# Patient Record
Sex: MALE | Race: WHITE | Employment: FULL TIME | URBAN - METROPOLITAN AREA
[De-identification: names, ages, dates, MRNs, and addresses within clinical notes are randomized per-mention and may not be internally consistent; named-entity substitution may affect disease eponyms.]

---

## 2017-01-24 ENCOUNTER — HOSPITAL ENCOUNTER (EMERGENCY)
Age: 57
Discharge: HOME | End: 2017-01-24
Attending: EMERGENCY MEDICINE

## 2017-01-24 VITALS
RESPIRATION RATE: 16 BRPM | HEART RATE: 63 BPM | BODY MASS INDEX: 35.6 KG/M2 | HEIGHT: 68 IN | WEIGHT: 234.9 LBS | OXYGEN SATURATION: 97 % | SYSTOLIC BLOOD PRESSURE: 131 MMHG | TEMPERATURE: 98.7 F | DIASTOLIC BLOOD PRESSURE: 82 MMHG

## 2017-01-24 DIAGNOSIS — R42 DIZZINESS: Primary | ICD-10-CM

## 2017-01-24 RX ORDER — ZOLPIDEM TARTRATE 10 MG/1
5 TABLET ORAL
COMMUNITY

## 2017-01-24 NOTE — DISCHARGE INSTRUCTIONS
Dizziness: Care Instructions  Your Care Instructions  Dizziness is the feeling of unsteadiness or fuzziness in your head. It is different than having vertigo, which is a feeling that the room is spinning or that you are moving or falling. It is also different from lightheadedness, which is the feeling that you are about to faint. It can be hard to know what causes dizziness. Some people feel dizzy when they have migraine headaches. Sometimes bouts of flu can make you feel dizzy. Some medical conditions, such as heart problems or high blood pressure, can make you feel dizzy. Many medicines can cause dizziness, including medicines for high blood pressure, pain, or anxiety. If a medicine causes your symptoms, your doctor may recommend that you stop or change the medicine. If it is a problem with your heart, you may need medicine to help your heart work better. If there is no clear reason for your symptoms, your doctor may suggest watching and waiting for a while to see if the dizziness goes away on its own. Follow-up care is a key part of your treatment and safety. Be sure to make and go to all appointments, and call your doctor if you are having problems. It's also a good idea to know your test results and keep a list of the medicines you take. How can you care for yourself at home? · If your doctor recommends or prescribes medicine, take it exactly as directed. Call your doctor if you think you are having a problem with your medicine. · Do not drive while you feel dizzy. · Try to prevent falls. Steps you can take include:  ¨ Using nonskid mats, adding grab bars near the tub, and using night-lights. ¨ Clearing your home so that walkways are free of anything you might trip on. ¨ Letting family and friends know that you have been feeling dizzy. This will help them know how to help you. When should you call for help? Call 911 anytime you think you may need emergency care.  For example, call if:  · You passed out (lost consciousness). · You have dizziness along with symptoms of a heart attack. These may include:  ¨ Chest pain or pressure, or a strange feeling in the chest.  ¨ Sweating. ¨ Shortness of breath. ¨ Nausea or vomiting. ¨ Pain, pressure, or a strange feeling in the back, neck, jaw, or upper belly or in one or both shoulders or arms. ¨ Lightheadedness or sudden weakness. ¨ A fast or irregular heartbeat. · You have symptoms of a stroke. These may include:  ¨ Sudden numbness, tingling, weakness, or loss of movement in your face, arm, or leg, especially on only one side of your body. ¨ Sudden vision changes. ¨ Sudden trouble speaking. ¨ Sudden confusion or trouble understanding simple statements. ¨ Sudden problems with walking or balance. ¨ A sudden, severe headache that is different from past headaches. Call your doctor now or seek immediate medical care if:  · You feel dizzy and have a fever, headache, or ringing in your ears. · You have new or increased nausea and vomiting. · Your dizziness does not go away or comes back. Watch closely for changes in your health, and be sure to contact your doctor if:  · You do not get better as expected. Where can you learn more? Go to http://lorie-yuridia.info/. Enter Q004 in the search box to learn more about \"Dizziness: Care Instructions. \"  Current as of: May 27, 2016  Content Version: 11.1  © 7033-2240 [employer]. Care instructions adapted under license by [employer] (which disclaims liability or warranty for this information). If you have questions about a medical condition or this instruction, always ask your healthcare professional. [place] [address] any warranty or liability for your use of this information.

## 2017-01-24 NOTE — UC PROVIDER NOTE
HPI Comments: Sudden onset dizziness associated with blurry vision, no loss of vision , no double vision,  No difficulty speaking or swallowing, no headache, no focal weakness in arms, legs or face- symptoms resolved after 30seconds    Patient is a 64 y.o. male presenting with dizziness. The history is provided by the patient. Dizziness   This is a new problem. The current episode started 3 to 5 hours ago. The problem has been resolved. There was no focality noted. Primary symptoms include visual change. Pertinent negatives include no focal weakness, no loss of sensation, no loss of balance, no slurred speech, no speech difficulty, no memory loss, no movement disorder, no agitation, no auditory change, no mental status change, no unresponsiveness and no disorientation. There has been no fever. Pertinent negatives include no shortness of breath, no chest pain, no vomiting, no altered mental status, no confusion, no headaches, no choking, no nausea, no bowel incontinence and no bladder incontinence. Associated medical issues do not include trauma, mood changes, bleeding disorder, seizures, dementia, CVA or clotting disorder. Past Medical History   Diagnosis Date    Depression 8/26/2014    Hypothyroidism 8/26/2014    Migraines 8/26/2014        Past Surgical History   Procedure Laterality Date    Hx orthopaedic       meniscus    Hx hernia repair           History reviewed. No pertinent family history. Social History     Social History    Marital status:      Spouse name: N/A    Number of children: N/A    Years of education: N/A     Occupational History    Not on file.      Social History Main Topics    Smoking status: Never Smoker    Smokeless tobacco: Former User    Alcohol use Yes    Drug use: Not on file    Sexual activity: Not on file     Other Topics Concern    Not on file     Social History Narrative                ALLERGIES: Review of patient's allergies indicates no known allergies. Review of Systems   Constitutional: Negative. HENT: Negative. Eyes: Positive for visual disturbance. Respiratory: Negative. Negative for choking and shortness of breath. Cardiovascular: Negative. Negative for chest pain. Gastrointestinal: Negative. Negative for bowel incontinence, nausea and vomiting. Genitourinary: Negative for bladder incontinence. Neurological: Positive for dizziness. Negative for focal weakness, seizures, syncope, facial asymmetry, speech difficulty, weakness, numbness, headaches and loss of balance. Psychiatric/Behavioral: Negative for agitation, confusion and memory loss. All other systems reviewed and are negative. Vitals:    01/24/17 1534   BP: 131/82   Pulse: 63   Resp: 16   Temp: 98.7 °F (37.1 °C)   SpO2: 97%   Weight: 106.5 kg (234 lb 14.4 oz)   Height: 5' 8\" (1.727 m)       Physical Exam   Constitutional: He is oriented to person, place, and time. He appears well-developed and well-nourished. HENT:   Head: Normocephalic and atraumatic. Mouth/Throat: Oropharynx is clear and moist. No oropharyngeal exudate. Eyes: Conjunctivae and EOM are normal. Pupils are equal, round, and reactive to light. Right eye exhibits no discharge. Left eye exhibits no discharge. No scleral icterus. Neck: Normal range of motion. Neck supple. No tracheal deviation present. No thyromegaly present. Cardiovascular: Normal rate, regular rhythm, normal heart sounds and intact distal pulses. No murmur heard. Pulmonary/Chest: Effort normal and breath sounds normal.   Musculoskeletal: Normal range of motion. He exhibits no edema or tenderness. Lymphadenopathy:     He has no cervical adenopathy. Neurological: He is alert and oriented to person, place, and time. No cranial nerve deficit. Coordination normal.   Skin: Skin is warm. No rash noted. No erythema. Psychiatric: He has a normal mood and affect.  His behavior is normal. Judgment and thought content normal. Nursing note and vitals reviewed.       MDM     Differential Diagnosis; Clinical Impression; Plan:     Dizziness resolved, possible vertigo, possible migraine equivalent given patient's history of same, doubt tia/cva- recommend to emergency department if symptoms recur, otherwise follow up pcp      Procedures

## 2017-04-29 ENCOUNTER — APPOINTMENT (OUTPATIENT)
Dept: GENERAL RADIOLOGY | Age: 57
End: 2017-04-29
Attending: FAMILY MEDICINE

## 2017-04-29 ENCOUNTER — HOSPITAL ENCOUNTER (EMERGENCY)
Age: 57
Discharge: HOME | End: 2017-04-29
Attending: FAMILY MEDICINE

## 2017-04-29 VITALS
SYSTOLIC BLOOD PRESSURE: 146 MMHG | RESPIRATION RATE: 18 BRPM | OXYGEN SATURATION: 97 % | DIASTOLIC BLOOD PRESSURE: 82 MMHG | HEART RATE: 60 BPM | TEMPERATURE: 98.6 F | HEIGHT: 67 IN | WEIGHT: 242 LBS | BODY MASS INDEX: 37.98 KG/M2

## 2017-04-29 DIAGNOSIS — M79.671 RIGHT FOOT PAIN: Primary | ICD-10-CM

## 2017-04-29 RX ORDER — DICLOFENAC POTASSIUM 50 MG/1
50 TABLET, FILM COATED ORAL 3 TIMES DAILY
Qty: 50 TAB | Refills: 0 | Status: SHIPPED | OUTPATIENT
Start: 2017-04-29 | End: 2017-07-15

## 2017-04-29 NOTE — DISCHARGE INSTRUCTIONS

## 2017-04-29 NOTE — UC PROVIDER NOTE
HPI Comments: Patient reports pain started soon after cutting grass 1 week ago. Patient is a 64 y.o. male presenting with foot pain. The history is provided by the patient. No  was used. Foot Pain    This is a new problem. The current episode started more than 1 week ago. The problem occurs constantly. The problem has not changed since onset. The pain is present in the right foot (Right foot pain). The quality of the pain is described as aching. The pain is at a severity of 8/10. The pain is moderate. Pertinent negatives include no numbness and full range of motion. The symptoms are aggravated by palpation and movement (ambulation). He has tried nothing for the symptoms. The treatment provided no relief. There has been no history of extremity trauma. Past Medical History:   Diagnosis Date    Depression 8/26/2014    Hypothyroidism 8/26/2014    Migraines 8/26/2014        Past Surgical History:   Procedure Laterality Date    HX HERNIA REPAIR      HX ORTHOPAEDIC      meniscus         History reviewed. No pertinent family history. Social History     Social History    Marital status:      Spouse name: N/A    Number of children: N/A    Years of education: N/A     Occupational History    Not on file. Social History Main Topics    Smoking status: Never Smoker    Smokeless tobacco: Former User    Alcohol use Yes    Drug use: Not on file    Sexual activity: Not on file     Other Topics Concern    Not on file     Social History Narrative                ALLERGIES: Review of patient's allergies indicates no known allergies. Review of Systems   Constitutional: Negative. HENT: Negative. Eyes: Negative. Respiratory: Negative. Cardiovascular: Negative. Gastrointestinal: Negative. Endocrine: Negative. Genitourinary: Negative. Musculoskeletal:        Right foot pain   Skin: Negative. Allergic/Immunologic: Negative. Neurological: Negative. Negative for numbness. Hematological: Negative. Psychiatric/Behavioral: Negative. Vitals:    04/29/17 0837   BP: 146/82   Pulse: 60   Resp: 18   Temp: 98.6 °F (37 °C)   SpO2: 97%   Weight: 109.8 kg (242 lb)   Height: 5' 7\" (1.702 m)       Physical Exam   Constitutional: He is oriented to person, place, and time. He appears well-developed and well-nourished. HENT:   Head: Normocephalic and atraumatic. Right Ear: External ear normal.   Left Ear: External ear normal.   Nose: Nose normal.   Mouth/Throat: Oropharynx is clear and moist.   Eyes: Conjunctivae and EOM are normal. Pupils are equal, round, and reactive to light. Neck: Normal range of motion. Neck supple. Cardiovascular: Normal rate, regular rhythm and normal heart sounds. Pulmonary/Chest: Effort normal and breath sounds normal.   Musculoskeletal: Normal range of motion. He exhibits no edema, tenderness or deformity. Right foot plantar surface between 1st and 2nd MT tenderness  No erythema   2+ DP/PT pulses  Nail intact  No deformity    Lymphadenopathy:     He has no cervical adenopathy. Neurological: He is alert and oriented to person, place, and time. Skin: Skin is warm and dry. Psychiatric: He has a normal mood and affect. His behavior is normal. Judgment and thought content normal.   Nursing note and vitals reviewed. MDM     Differential Diagnosis; Clinical Impression; Plan:     CLINICAL IMPRESSION:  Right foot pain  (primary encounter diagnosis)    Plan:  1. Right foot pain. 2. Take Diclofenac as directed with food. 3. Follow up with Podiatry. Amount and/or Complexity of Data Reviewed:   Tests in the radiology section of CPT®:  Ordered and reviewed  Risk of Significant Complications, Morbidity, and/or Mortality:   Presenting problems: Moderate  Diagnostic procedures:   Moderate  Progress:   Patient progress:  Stable      Procedures

## 2017-07-15 ENCOUNTER — HOSPITAL ENCOUNTER (OUTPATIENT)
Age: 57
Setting detail: OBSERVATION
Discharge: HOME | End: 2017-07-16
Attending: EMERGENCY MEDICINE | Admitting: INTERNAL MEDICINE
Payer: COMMERCIAL

## 2017-07-15 ENCOUNTER — APPOINTMENT (OUTPATIENT)
Dept: GENERAL RADIOLOGY | Age: 57
End: 2017-07-15
Attending: EMERGENCY MEDICINE
Payer: COMMERCIAL

## 2017-07-15 DIAGNOSIS — I24.9 ACS (ACUTE CORONARY SYNDROME) (HCC): ICD-10-CM

## 2017-07-15 DIAGNOSIS — I25.9 CHEST PAIN DUE TO MYOCARDIAL ISCHEMIA, UNSPECIFIED ISCHEMIC CHEST PAIN TYPE: Primary | ICD-10-CM

## 2017-07-15 LAB
ALBUMIN SERPL BCP-MCNC: 3.7 G/DL (ref 3.5–5)
ALBUMIN/GLOB SERPL: 1.1 {RATIO} (ref 1.1–2.2)
ALP SERPL-CCNC: 85 U/L (ref 45–117)
ALT SERPL-CCNC: 43 U/L (ref 12–78)
ANION GAP BLD CALC-SCNC: 8 MMOL/L (ref 5–15)
AST SERPL W P-5'-P-CCNC: 21 U/L (ref 15–37)
BASOPHILS # BLD AUTO: 0 K/UL (ref 0–0.1)
BASOPHILS # BLD: 0 % (ref 0–1)
BILIRUB SERPL-MCNC: 0.4 MG/DL (ref 0.2–1)
BUN SERPL-MCNC: 30 MG/DL (ref 6–20)
BUN/CREAT SERPL: 18 (ref 12–20)
CALCIUM SERPL-MCNC: 9 MG/DL (ref 8.5–10.1)
CHLORIDE SERPL-SCNC: 108 MMOL/L (ref 97–108)
CO2 SERPL-SCNC: 25 MMOL/L (ref 21–32)
CREAT SERPL-MCNC: 1.71 MG/DL (ref 0.7–1.3)
EOSINOPHIL # BLD: 0.2 K/UL (ref 0–0.4)
EOSINOPHIL NFR BLD: 2 % (ref 0–7)
ERYTHROCYTE [DISTWIDTH] IN BLOOD BY AUTOMATED COUNT: 13.4 % (ref 11.5–14.5)
GLOBULIN SER CALC-MCNC: 3.5 G/DL (ref 2–4)
GLUCOSE SERPL-MCNC: 104 MG/DL (ref 65–100)
HCT VFR BLD AUTO: 41.6 % (ref 36.6–50.3)
HGB BLD-MCNC: 15.1 G/DL (ref 12.1–17)
INR PPP: 1.2 (ref 0.9–1.1)
LYMPHOCYTES # BLD AUTO: 34 % (ref 12–49)
LYMPHOCYTES # BLD: 3.3 K/UL (ref 0.8–3.5)
MAGNESIUM SERPL-MCNC: 2.3 MG/DL (ref 1.6–2.4)
MCH RBC QN AUTO: 33.2 PG (ref 26–34)
MCHC RBC AUTO-ENTMCNC: 36.3 G/DL (ref 30–36.5)
MCV RBC AUTO: 91.4 FL (ref 80–99)
MONOCYTES # BLD: 0.6 K/UL (ref 0–1)
MONOCYTES NFR BLD AUTO: 7 % (ref 5–13)
NEUTS SEG # BLD: 5.4 K/UL (ref 1.8–8)
NEUTS SEG NFR BLD AUTO: 57 % (ref 32–75)
PLATELET # BLD AUTO: 184 K/UL (ref 150–400)
POTASSIUM SERPL-SCNC: 3.4 MMOL/L (ref 3.5–5.1)
PROT SERPL-MCNC: 7.2 G/DL (ref 6.4–8.2)
PROTHROMBIN TIME: 11.7 SEC (ref 9–11.1)
RBC # BLD AUTO: 4.55 M/UL (ref 4.1–5.7)
SALICYLATES SERPL-MCNC: <1.7 MG/DL (ref 2.8–20)
SODIUM SERPL-SCNC: 141 MMOL/L (ref 136–145)
TROPONIN I SERPL-MCNC: 0.12 NG/ML
WBC # BLD AUTO: 9.5 K/UL (ref 4.1–11.1)

## 2017-07-15 PROCEDURE — 80053 COMPREHEN METABOLIC PANEL: CPT | Performed by: EMERGENCY MEDICINE

## 2017-07-15 PROCEDURE — 80307 DRUG TEST PRSMV CHEM ANLYZR: CPT | Performed by: EMERGENCY MEDICINE

## 2017-07-15 PROCEDURE — 85025 COMPLETE CBC W/AUTO DIFF WBC: CPT | Performed by: EMERGENCY MEDICINE

## 2017-07-15 PROCEDURE — 85610 PROTHROMBIN TIME: CPT | Performed by: EMERGENCY MEDICINE

## 2017-07-15 PROCEDURE — 99285 EMERGENCY DEPT VISIT HI MDM: CPT

## 2017-07-15 PROCEDURE — 84484 ASSAY OF TROPONIN QUANT: CPT | Performed by: EMERGENCY MEDICINE

## 2017-07-15 PROCEDURE — 36415 COLL VENOUS BLD VENIPUNCTURE: CPT | Performed by: EMERGENCY MEDICINE

## 2017-07-15 PROCEDURE — 93005 ELECTROCARDIOGRAM TRACING: CPT

## 2017-07-15 PROCEDURE — 71010 XR CHEST PORT: CPT

## 2017-07-15 PROCEDURE — 65660000000 HC RM CCU STEPDOWN

## 2017-07-15 PROCEDURE — 83735 ASSAY OF MAGNESIUM: CPT | Performed by: EMERGENCY MEDICINE

## 2017-07-15 RX ORDER — SODIUM CHLORIDE 0.9 % (FLUSH) 0.9 %
5-10 SYRINGE (ML) INJECTION AS NEEDED
Status: DISCONTINUED | OUTPATIENT
Start: 2017-07-15 | End: 2017-07-16 | Stop reason: HOSPADM

## 2017-07-15 RX ORDER — ASPIRIN 325 MG
325 TABLET ORAL ONCE
Status: COMPLETED | OUTPATIENT
Start: 2017-07-15 | End: 2017-07-16

## 2017-07-15 RX ORDER — METOPROLOL TARTRATE 25 MG/1
25 TABLET, FILM COATED ORAL
Status: COMPLETED | OUTPATIENT
Start: 2017-07-15 | End: 2017-07-16

## 2017-07-15 RX ORDER — SODIUM CHLORIDE 0.9 % (FLUSH) 0.9 %
5-10 SYRINGE (ML) INJECTION EVERY 8 HOURS
Status: DISCONTINUED | OUTPATIENT
Start: 2017-07-15 | End: 2017-07-16 | Stop reason: HOSPADM

## 2017-07-16 VITALS
OXYGEN SATURATION: 98 % | SYSTOLIC BLOOD PRESSURE: 119 MMHG | DIASTOLIC BLOOD PRESSURE: 73 MMHG | RESPIRATION RATE: 16 BRPM | WEIGHT: 240 LBS | TEMPERATURE: 97.7 F | BODY MASS INDEX: 37.67 KG/M2 | HEIGHT: 67 IN | HEART RATE: 46 BPM

## 2017-07-16 PROBLEM — R07.9 CHEST PAIN: Status: ACTIVE | Noted: 2017-07-16

## 2017-07-16 LAB
ATRIAL RATE: 76 BPM
CALCULATED P AXIS, ECG09: 59 DEGREES
CALCULATED R AXIS, ECG10: 36 DEGREES
CALCULATED T AXIS, ECG11: 163 DEGREES
DIAGNOSIS, 93000: NORMAL
P-R INTERVAL, ECG05: 154 MS
Q-T INTERVAL, ECG07: 378 MS
QRS DURATION, ECG06: 74 MS
QTC CALCULATION (BEZET), ECG08: 425 MS
TROPONIN I SERPL-MCNC: 0.15 NG/ML
VENTRICULAR RATE, ECG03: 76 BPM

## 2017-07-16 PROCEDURE — 99218 HC RM OBSERVATION: CPT

## 2017-07-16 PROCEDURE — 74011250637 HC RX REV CODE- 250/637: Performed by: EMERGENCY MEDICINE

## 2017-07-16 PROCEDURE — 84484 ASSAY OF TROPONIN QUANT: CPT | Performed by: INTERNAL MEDICINE

## 2017-07-16 PROCEDURE — 77010033678 HC OXYGEN DAILY

## 2017-07-16 PROCEDURE — 36415 COLL VENOUS BLD VENIPUNCTURE: CPT | Performed by: INTERNAL MEDICINE

## 2017-07-16 RX ORDER — SODIUM CHLORIDE 0.9 % (FLUSH) 0.9 %
5-10 SYRINGE (ML) INJECTION EVERY 8 HOURS
Status: CANCELLED | OUTPATIENT
Start: 2017-07-16

## 2017-07-16 RX ORDER — SODIUM CHLORIDE 0.9 % (FLUSH) 0.9 %
5-10 SYRINGE (ML) INJECTION AS NEEDED
Status: CANCELLED | OUTPATIENT
Start: 2017-07-16

## 2017-07-16 RX ADMIN — METOPROLOL TARTRATE 25 MG: 25 TABLET ORAL at 00:15

## 2017-07-16 RX ADMIN — ASPIRIN 325 MG ORAL TABLET 325 MG: 325 PILL ORAL at 00:15

## 2017-07-16 RX ADMIN — Medication 10 ML: at 00:15

## 2017-07-16 NOTE — H&P
[place] [facility], [address]   HISTORY AND PHYSICAL       Name:  Kristyn Patino   MR#:  [id number]   :  1960   Account #:  [de-identified]        Date of Adm:  07/15/2017       CHIEF COMPLAINT: He presented to the emergency room last   evening. HISTORY OF PRESENT ILLNESS: He had been working outside all   day during the day, yesterday it was quite hot. 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 He felt fine while he was doing work, had no chest discomfort or other   symptoms. He and his son went and got a sub for dinner and he was sitting at   home in his reclining couch resting in the evening after dinner. He   began having some unusual achings in the left side beginning in his   leg and left arm and shoulder. He just could not find a position of   comfort in the recliner. Turns on his side, had some aching up the left   side of his neck and encircling the left ear and left side of his head he   had a headache. This all seemed rather odd to him. He is used to   having aches and pains, but this was more specific, localized left sided   discomfort. This included some left sided chest pain. He called for his   wife. His wife gave him an aspirin and they decided to come to the   emergency room. By the time he arrived here, most of his symptoms   had resolved. He was pretty much painfree. He really did not think that   he needed to take anything else for the pain at the time. His evaluation here was unremarkable. His EKG showed sinus rhythm   with some minor non-specific ST changes and similar to tracing from   one year ago. LABORATORY STUDIES: Showed a troponin level that was 0.12. There was no CK drawn. His creatinine is up a little bit at 1.71. BUN of   30.  So I suspect he is a little dehydrated from all the work yesterday   even though he was trying to drink and stay hydrated through the day. At no time during all of his exertion did he have any chest discomfort. He was evaluated here almost exactly a year ago, 07/2016, presenting   with bilateral arm pain, fatigue and heaviness. At that time he had right   arm discomfort worse than the left. Radiation of pain into the right side   of his jaw. He has reported similar symptoms in the past. Last year his   event occurred after spending the day walking outside at [place]   in the hot weather. Last year his creatinine was 1.4, just a little elevated   as well. He also had some minor increase in his troponin level 0.08 on   that occasion. He subsequently had a stress echo test in our office   which was normal.    Also with last night's event he felt like his heart was racing. His wife   checked his pulse and blood pressure which pulse was 103 and blood   pressure 136/86 so not too significant at that point. He was free of any discomfort overnight here. He did not sleep well, he   uses a CPAP which he didn't have with him here. PAST HISTORY: Notable for hyperlipidemia, sleep apnea, hypothyroid   on replacement, diabetes mellitus on oral agents. MEDICATIONS: Include:    1. Amino acids. 2. Fish oil. 3. Some form of root extract. 4. Zocor/simvastatin 10 mg nightly. 5. Zoloft 50 mg daily. 6. Synthroid 150 micrograms daily. 7. Metformin 500 mg b.i.d.    8. Topamax 5 mg b.i.d. ALLERGIES: UNKNOWN. SOCIAL: Nonsmoker, he is employed as an , works for   [employer], does software design. FAMILY HISTORY: Negative for premature coronary disease. REVIEW OF SYSTEMS: Comprehensive review of systems as above,   otherwise unremarkable. PHYSICAL EXAMINATION   GENERAL: He appears stated age, no acute distress. VITAL SIGNS: Afebrile, pulse rate is running about 60, blood pressure   120/80, respirations 18, saturation 98% on room air. HEENT: Atraumatic. SKIN: Warm and dry. LUNGS: Clear to auscultation. CHEST: No tenderness on palpation. HEART: Regular rhythm and rate, no murmurs, rubs or gallops. ABDOMEN: Soft, nontender. EXTREMITIES: No clubbing, cyanosis or edema. NEURO: Exam is grossly normal, no focal neurologic deficits. ASSESSMENT:    1. Chest pain, likely musculoskeletal.   2. Dehydration with elevated creatinine. 3. Sleep apnea. 4. Hyperlipidemia. 5. Diabetes mellitus type 2 on oral agents. PLAN: We are going to repeat a troponin and get a CK level. If these   are within reasonable range, then I think we could safely discharge him   and follow up as an outpatient. I suspect that this was all due to the   physical exertion of moving furniture, etc. yesterday during the heat of   the day, a long day, with some dehydration. Could do a stress test, but must say that 8 hours of moving furniture in   the heat yesterday is certainly a stress test in and of itself.          Lorenza Lantigua MD      [address] [facility] /    D:  07/16/2017   08:29   T:  07/16/2017   09:29   Job #:  [id number]

## 2017-07-16 NOTE — ED NOTES
TRANSFER - OUT REPORT:    Verbal report given to Tj Carr (name) on Anju Sánchez being transferred to [facility] (unit) for routine progression of care       Report consisted of patients Situation, Background, Assessment and   Recommendations(SBAR). Information from the following report(s) SBAR, Kardex, ED Summary, Intake/Output, MAR, Accordion, Recent Results and Cardiac Rhythm NSR was reviewed with the receiving nurse. Lines:   Peripheral IV 07/15/17 Left Antecubital (Active)   Site Assessment Clean, dry, & intact 7/15/2017 10:28 PM   Phlebitis Assessment 0 7/15/2017 10:28 PM   Infiltration Assessment 0 7/15/2017 10:28 PM   Dressing Status Clean, dry, & intact 7/15/2017 10:28 PM   Dressing Type Tape;Transparent 7/15/2017 10:28 PM   Hub Color/Line Status Pink;Flushed 7/15/2017 10:28 PM   Action Taken Blood drawn 7/15/2017 10:28 PM        Opportunity for questions and clarification was provided.       Patient transported with:   Monitor  Registered Nurse

## 2017-07-16 NOTE — PROGRESS NOTES
Feeling better. Repeat Troponin unchanged. No indication that this is cardiac . I think this is MS, along with some dehydration from the exertion and heat yesterday. OK to discharge. Eat, drink , sleep and should feel fine. No other studies needed    See PRN.

## 2017-07-16 NOTE — DISCHARGE SUMMARY
[address] [place] [address] [place]   DISCHARGE SUMMARY       Name:  Jacques Ferraro   MR#:  [id number]   :  1960   Account #:  [de-identified]        Date of Adm:  07/15/2017       DISCHARGE DIAGNOSES     1. Noncardiac chest pain. 2. Dehydration. 3. Sleep apnea. 4. Hyperlipidemia. 5. Diabetes mellitus type 2, on oral agents. DISCHARGE REGIMEN: He will stay on his same medications   including    1. Amino acid daily. 2. Root extract daily. 3. Fish oil. 4. Synthroid 150 mcg daily. 5. Metformin 500 mg b.i.d.   6. Zoloft 50 mg daily. 7. Simvastatin 10 mg daily. 8. Topamax 100 mg b.i.d. These are unchanged from his preadmission medications. I have   added no new medications. FOLLOWUP: He will follow up with his primary care doctor, Girish Drake. We will see him back on a p.r.n. basis. HISTORY: He is a 59-year-old male who came to the emergency room   last night with symptoms of left-sided leg aching, arm aching, aching   up into his shoulder, the left side of his chest and up the left head and   ear. He had been working hard yesterday helping someone move for   about 8 or 10 hours in the heat, carrying heavy furniture, etc. He tried   to stay hydrated. His EKG showed no changes. His troponin level was 0.12 and 0.15, so   slight increase on 2 determinations. His creatinine on admission was   1.7, consistent with some dehydration, BUN was 30. Baseline was 1.4   and 20 previously. He did not sleep well overnight. He uses a CPAP which he did not   have here and just with all the commotion he did not sleep well but he   feels fine today. He has been up and around. His symptoms are better. We will get him something to eat and drink and I think that he can be   discharged home and follow up as needed. There is nothing here that would indicate this being a cardiac event.      I really do not think a stress test is going to be helpful. He has had a   couple of negative stress tests in the past. Most recently he had a   stress test 1 year ago for an episode that was very similar to this   occasion. Certainly, moving furniture for 8 or 10 hours yesterday in the heat is a   significant stress test. He had no symptoms whatsoever during the   activity and I do not think there is anything further that needs to be   done. We will see him on a p.r.n. basis.               MD [place] / Jamie Webster   D:  07/16/2017   13:33   T:  07/16/2017   13:54   Job #:  [id number]

## 2017-07-16 NOTE — ED PROVIDER NOTES
HPI Comments: Rojas Morgan, 64 y.o. Male with PMHx of Hypothyroidism , presents ambulatory to ED [facility] ED with cc of constant, but improved pain in his left leg and left arm, which began 1.5 hours ago while watching TV in his recliner. He also developed a left-sided HA. He states he had a hard time seeing out of his left eye but was unsure if it was just runny due to allergies. He readjusted his position in the chair to try to alleviate the pain his extremities. He states he then thought he was having acid reflux coming on as he was having a burning sensation in his chest and then his heart started racing. He took his pulses and BP at home, which were 103 and 136/86, respectively. He took a baby ASA and decided to come to the ED. He notes that he helped his friends move today in the heat for 9 hours. He reports he is feeling better now. He states he has had chest pain in the past for which he saw a cardiologist, did a stress test, and was cleared. PCP: Hermila Aguero MD    Social history significant for: Former Tobacco, - EtOH, - Illicit Drug Use    There are no other complaints, changes, or physical findings at this time. The history is provided by the patient. Past Medical History:   Diagnosis Date    Depression 8/26/2014    Hypothyroidism 8/26/2014    Migraines 8/26/2014       Past Surgical History:   Procedure Laterality Date    HX APPENDECTOMY      HX HERNIA REPAIR      HX ORTHOPAEDIC      meniscus         History reviewed. No pertinent family history. Social History     Social History    Marital status:      Spouse name: N/A    Number of children: N/A    Years of education: N/A     Occupational History    Not on file.      Social History Main Topics    Smoking status: Never Smoker    Smokeless tobacco: Former User    Alcohol use No    Drug use: No    Sexual activity: Not on file     Other Topics Concern    Not on file     Social History Narrative         ALLERGIES: Review of patient's allergies indicates no known allergies. Review of Systems   Constitutional: Negative. Negative for activity change, appetite change, chills, fatigue, fever and unexpected weight change. HENT: Negative. Negative for congestion, hearing loss, rhinorrhea, sneezing and voice change. Eyes: Positive for visual disturbance. Negative for pain. Respiratory: Negative. Negative for apnea, cough, choking, chest tightness and shortness of breath. Cardiovascular: Positive for chest pain (burning) and palpitations. Gastrointestinal: Negative. Negative for abdominal distention, abdominal pain, blood in stool, diarrhea, nausea and vomiting. Genitourinary: Negative. Negative for difficulty urinating, flank pain, frequency and urgency. No discharge   Musculoskeletal: Positive for myalgias. Negative for arthralgias, back pain and neck stiffness. Skin: Negative. Negative for color change and rash. Neurological: Negative. Negative for dizziness, seizures, syncope, speech difficulty, weakness, numbness and headaches. Hematological: Negative for adenopathy. Psychiatric/Behavioral: Negative. Negative for agitation, behavioral problems, dysphoric mood and suicidal ideas. The patient is not nervous/anxious. Vitals:    07/16/17 0200 07/16/17 0252 07/16/17 0635 07/16/17 1233   BP: 119/77 112/68 102/63 119/73   Pulse: (!) 50 (!) 54 64 (!) 46   Resp: 16 16 16 16   Temp:  97.9 °F (36.6 °C) 98.2 °F (36.8 °C) 97.7 °F (36.5 °C)   SpO2: 97% 98% 99% 98%   Weight:       Height:                Physical Exam   Constitutional: He is oriented to person, place, and time. He appears well-developed and well-nourished. No distress. HENT:   Head: Normocephalic and atraumatic. Mouth/Throat: Oropharynx is clear and moist. No oropharyngeal exudate. Eyes: Conjunctivae and EOM are normal. Pupils are equal, round, and reactive to light. Right eye exhibits no discharge.  Left eye exhibits no discharge. Neck: Normal range of motion. Neck supple. Cardiovascular: Normal rate, regular rhythm and intact distal pulses. Exam reveals no gallop and no friction rub. No murmur heard. Pulmonary/Chest: Effort normal and breath sounds normal. No respiratory distress. He has no wheezes. He has no rales. He exhibits no tenderness. Abdominal: Soft. Bowel sounds are normal. He exhibits no distension and no mass. There is no tenderness. There is no rebound and no guarding. Musculoskeletal: Normal range of motion. He exhibits no edema. Lymphadenopathy:     He has no cervical adenopathy. Neurological: He is alert and oriented to person, place, and time. No cranial nerve deficit. Coordination normal.   Skin: Skin is warm and dry. No rash noted. No erythema. Psychiatric: He has a normal mood and affect. Nursing note and vitals reviewed. MDM  Number of Diagnoses or Management Options  Diagnosis management comments: DDx: arrythmia, ACS, dehydration, musculoskeletal        Amount and/or Complexity of Data Reviewed  Clinical lab tests: reviewed and ordered  Tests in the radiology section of CPT®: ordered and reviewed  Tests in the medicine section of CPT®: ordered and reviewed  Review and summarize past medical records: yes  Discuss the patient with other providers: yes (Cardiology)  Independent visualization of images, tracings, or specimens: yes      ED Course       Procedures      Chief Complaint   Patient presents with    Chest Pain     L arm pain and L leg pain x 1 hour PTA, reports that \"pulse went up to 103 and my BP was 136/86\"       11:01 PM  The patients presenting problems have been discussed, and they are in agreement with the care plan formulated and outlined with them. I have encouraged them to ask questions as they arise throughout their visit.     MEDICATIONS GIVEN:  Medications   aspirin (ASPIRIN) tablet 325 mg (325 mg Oral Given 7/16/17 0015)   metoprolol tartrate (LOPRESSOR) tablet 25 mg (25 mg Oral Given 7/16/17 0015)       LABS REVIEWED:  No results found for this or any previous visit (from the past 24 hour(s)). VITAL SIGNS:  No data found. RADIOLOGY RESULTS:  The following have been ordered and reviewed:  XR CHEST PORT   Final Result   CXR Results  (Last 48 hours)    None             EKG interpretation: (Preliminary) 21:14  Rhythm: normal sinus rhythm; and regular . Rate (approx.): 76; Axis: normal; P wave: normal; QRS interval: normal ; ST/T wave: normal; No change since 7/2016. CONSULTATIONS:     11:43 PM  Daron Kyle MD spoke with Dr. Jeanine Siddiqui, Consult for Cardiology. Discussed HPI and PE, available diagnostic tests and clinical findings. He is in agreement with care plans as outlined. He/she wants patient admitted. PROGRESS NOTES:  10:57 PM  I have just reevaluated the patient. I have reviewed His vital signs and determined there is currently no worsening in their condition or physical exam.  Results have been reviewed with them and their questions have been answered. We will continue to review further results as they come available. DIAGNOSIS:    1. Chest pain due to myocardial ischemia, unspecified ischemic chest pain type ([address]    2. ACS (acute coronary syndrome) [facility])        PLAN:  Follow-up Information     Follow up With Details Comments [address], [address] Lam Knight MD  If symptoms worsen [address]  [id number]  [address] [address]  [phone]          Discharge Medication List as of 7/16/2017  1:46 PM      CONTINUE these medications which have NOT CHANGED    Details   AMINO ACIDS (AMINO ACID PO) Take  by mouth.  Indications: glycine supplement, Historical Med      omega-3 fatty acids-vitamin e (FISH OIL) 1,000 mg cap Take 1 Cap by mouth., Historical Med      ashwagandha root extract,bulk, 2.5 % powd by Does Not Apply route., Historical Med      zolpidem (AMBIEN) 10 mg tablet Take 5 mg by mouth nightly as needed for Sleep., Historical Med      simvastatin (ZOCOR) 10 mg tablet Take  by mouth nightly., Historical Med      sertraline (ZOLOFT) 50 mg tablet Take  by mouth daily. , Historical Med      levothyroxine (LEVOTHROID) 150 mcg tablet Take  by mouth Daily (before breakfast). , Historical Med      metformin (GLUCOPHAGE) 500 mg tablet Take  by mouth two (2) times daily (with meals). , Historical Med      topiramate (TOPAMAX) 100 mg tablet Take  by mouth two (2) times a day., Historical Med               ED COURSE: The patients hospital course has been uncomplicated. 12:33 AM  Patient is being admitted to the hospital.  The results of their tests and reasons for their admission have been discussed with them and/or available family. They convey agreement and understanding for the need to be admitted and for their admission diagnosis. Consultation has been made with the inpatient physician specialist for hospitalization. This note is prepared by Randa Fung, acting as a Scribe for Stefany Tran. Charlie Kyle, [facility] Charlie Kyle MD: The scribe's documentation has been prepared under my direction and personally reviewed by me in its entirety. I confirm that the notes above accurately reflects all work, treatment, procedures, and medical decision making performed by me.

## 2017-07-16 NOTE — ED TRIAGE NOTES
Patient arrives ambulatory to ED with complaint of pain and discomfort in left arm and left leg; pulse started racing; \"felt weird. \" Patient states that these symptoms started about an hour ago and has seen gotten somewhat better. Patient states his left eye was runny and blurry and has a headache on left side of head. Patient states he felt an acid reflux/buringing sensation in his chest. Patient denies shortness of breath, N/V/D. Patient states he felt sick when it happened. Per patient his he felt his heart was racing and heart rate went to 103, BP was up.

## 2017-11-26 ENCOUNTER — HOSPITAL ENCOUNTER (EMERGENCY)
Age: 57
Discharge: HOME | End: 2017-11-26
Attending: FAMILY MEDICINE

## 2017-11-26 VITALS
TEMPERATURE: 98 F | HEIGHT: 67 IN | RESPIRATION RATE: 20 BRPM | OXYGEN SATURATION: 97 % | WEIGHT: 239 LBS | SYSTOLIC BLOOD PRESSURE: 142 MMHG | DIASTOLIC BLOOD PRESSURE: 79 MMHG | BODY MASS INDEX: 37.51 KG/M2 | HEART RATE: 78 BPM

## 2017-11-26 DIAGNOSIS — J98.01 COUGH DUE TO BRONCHOSPASM: Primary | ICD-10-CM

## 2017-11-26 DIAGNOSIS — J30.9 ACUTE ALLERGIC RHINITIS, UNSPECIFIED SEASONALITY, UNSPECIFIED TRIGGER: ICD-10-CM

## 2017-11-26 RX ORDER — ESOMEPRAZOLE MAGNESIUM 40 MG/1
CAPSULE, DELAYED RELEASE ORAL DAILY
COMMUNITY

## 2017-11-26 RX ORDER — PREDNISONE 5 MG/1
TABLET ORAL
Qty: 21 TAB | Refills: 0 | Status: SHIPPED | OUTPATIENT
Start: 2017-11-26 | End: 2018-02-07

## 2017-11-26 RX ORDER — PHENTERMINE HYDROCHLORIDE 15 MG/1
15 CAPSULE ORAL
COMMUNITY
End: 2020-12-14

## 2017-11-26 RX ORDER — FLUTICASONE PROPIONATE 50 MCG
2 SPRAY, SUSPENSION (ML) NASAL DAILY
Qty: 1 BOTTLE | Refills: 0 | Status: SHIPPED | OUTPATIENT
Start: 2017-11-26 | End: 2017-12-26

## 2017-11-26 NOTE — UC PROVIDER NOTE
HPI Comments:   2 weeks of intermittent cough. It is not constant or daily. It is mostly dry. However sometimes has some phlegm in back of throat. After coughing hard does have wheezing. Has been using sons albuterol that resolves cough. Cough worse in evenings compared to mornings. Had flare up after raking leaves in yard a couple days ago. No fever or chills, no SOB or chest pains. No history of asthma. Did see allergist recently with multiple allergens was put on antihistamine and taking daily. History significant for GERD, depression, hypothyroidism    Patient is a 62 y.o. male presenting with cold symptoms. Cold Symptoms    Pertinent negatives include no chest pain, no nausea and no vomiting. Past Medical History:   Diagnosis Date    Depression 8/26/2014    Hypothyroidism 8/26/2014    Migraines 8/26/2014        Past Surgical History:   Procedure Laterality Date    HX APPENDECTOMY      HX HERNIA REPAIR      HX ORTHOPAEDIC      meniscus         History reviewed. No pertinent family history. Social History     Social History    Marital status:      Spouse name: N/A    Number of children: N/A    Years of education: N/A     Occupational History    Not on file. Social History Main Topics    Smoking status: Never Smoker    Smokeless tobacco: Former User    Alcohol use No    Drug use: No    Sexual activity: Not on file     Other Topics Concern    Not on file     Social History Narrative                ALLERGIES: Review of patient's allergies indicates no known allergies. Review of Systems   Constitutional: Negative for chills and fever. Cardiovascular: Negative for chest pain. Gastrointestinal: Negative for nausea and vomiting. All other systems reviewed and are negative.       Vitals:    11/26/17 1237   BP: 142/79   Pulse: 78   Resp: 20   Temp: 98 °F (36.7 °C)   SpO2: 97%   Weight: 108.4 kg (239 lb)   Height: 5' 7\" (1.702 m)       Physical Exam   Constitutional: He is oriented to person, place, and time. No distress. Non-toxic appearing, well hydrated   HENT:   Pale boggy nasal turbinates R and L. No purulent discharge. OP with post nasal drip on wall. No swelling, erythema or exudates. TMs normal bilat. Eyes: Conjunctivae and EOM are normal. Pupils are equal, round, and reactive to light. No scleral icterus. Neck: Normal range of motion. Neck supple. Cardiovascular: Normal rate, regular rhythm and normal heart sounds. Exam reveals no gallop and no friction rub. No murmur heard. Pulmonary/Chest: Effort normal. No respiratory distress. He has wheezes. He has no rales. Few soft scattered wheeze upper lung fields   Lymphadenopathy:     He has no cervical adenopathy. Neurological: He is alert and oriented to person, place, and time. No cranial nerve deficit. Skin: Skin is warm and dry. No rash noted. He is not diaphoretic. No erythema. No pallor. Psychiatric: He has a normal mood and affect. His behavior is normal. Thought content normal.   Nursing note and vitals reviewed. MDM     Differential Diagnosis; Clinical Impression; Plan:       CLINICAL IMPRESSION:  (J98.01) Cough due to bronchospasm  (primary encounter diagnosis)  (J30.9) Acute allergic rhinitis, unspecified seasonality, unspecified trigger    Orders Placed This Encounter     RX:      fluticasone (FLONASE) 50 mcg/actuation nasal spray      predniSONE (STERAPRED) 5 mg dose pack    Follow up with allergist.  Schedule with PCP for re-eval in 1 week. DDX: post nasal drip cough syndrome, GERD    Plan:  1. See above orders. 2. Immediate follow up for new or worsening. Risk of Significant Complications, Morbidity, and/or Mortality:   Presenting problems: Moderate  Diagnostic procedures: Moderate  Management options:   Moderate  Progress:   Patient progress:  Stable      Procedures

## 2017-11-26 NOTE — DISCHARGE INSTRUCTIONS
Wheezing or Bronchoconstriction: Care Instructions  Your Care Instructions  Wheezing is a whistling noise made during breathing. It occurs when the small airways, or bronchial tubes, that lead to your lungs swell or contract (spasm) and become narrow. This narrowing is called bronchoconstriction. When your airways constrict, it is hard for air to pass through and this makes it hard for you to breathe. Wheezing and bronchoconstriction can be caused by many problems, including:  · An infection such as the flu or a cold. · Allergies such as hay fever. · Diseases such as asthma or chronic obstructive pulmonary disease. · Smoking. Treatment for your wheezing depends on what is causing the problem. Your wheezing may get better without treatment. But you may need to pay attention to things that cause your wheezing and avoid them. Or you may need medicine to help treat the wheezing and to reduce the swelling or to relieve spasms in your lungs. Follow-up care is a key part of your treatment and safety. Be sure to make and go to all appointments, and call your doctor if you are having problems. It is also a good idea to know your test results and keep a list of the medicines you take. How can you care for yourself at home? · Take your medicine exactly as prescribed. Call your doctor if you think you are having a problem with your medicine. You will get more details on the specific medicine your doctor prescribes. · If your doctor prescribed antibiotics, take them as directed. Do not stop taking them just because you feel better. You need to take the full course of antibiotics. · Breathe moist air from a humidifier, hot shower, or sink filled with hot water. This may help ease your symptoms and make it easier for you to breathe. · If you have congestion in your nose and throat, drinking plenty of fluids, especially hot fluids, may help relieve your symptoms.  If you have kidney, heart, or liver disease and have to limit fluids, talk with your doctor before you increase the amount of fluids you drink. · If you have mucus in your airways, it may help to breathe deeply and cough. · Do not smoke or allow others to smoke around you. Smoking can make your wheezing worse. If you need help quitting, talk to your doctor about stop-smoking programs and medicines. These can increase your chances of quitting for good. · Avoid things that may cause your wheezing. These may include colds, smoke, air pollution, dust, pollen, pets, cockroaches, stress, and cold air. When should you call for help? Call 911 anytime you think you may need emergency care. For example, call if:  ? · You have severe trouble breathing. ? · You passed out (lost consciousness). ?Call your doctor now or seek immediate medical care if:  ? · You cough up yellow, dark brown, or bloody mucus (sputum). ? · You have new or worse shortness of breath. ? · Your wheezing is not getting better or it gets worse after you start taking your medicine. ? Watch closely for changes in your health, and be sure to contact your doctor if:  ? · You do not get better as expected. Where can you learn more? Go to http://lorie-yuridia.info/. Enter 454 [id number] in the search box to learn more about \"Wheezing or Bronchoconstriction: Care Instructions. \"  Current as of: May 12, 2017  Content Version: 11.4  © 9744-7707 [employer]. Care instructions adapted under license by [employer] (which disclaims liability or warranty for this information). If you have questions about a medical condition or this instruction, always ask your healthcare professional. [address] any warranty or liability for your use of this information.

## 2018-02-07 ENCOUNTER — HOSPITAL ENCOUNTER (EMERGENCY)
Age: 58
Discharge: HOME | End: 2018-02-07
Attending: FAMILY MEDICINE

## 2018-02-07 VITALS
TEMPERATURE: 98.2 F | OXYGEN SATURATION: 98 % | RESPIRATION RATE: 20 BRPM | HEIGHT: 67 IN | BODY MASS INDEX: 40.02 KG/M2 | SYSTOLIC BLOOD PRESSURE: 132 MMHG | HEART RATE: 77 BPM | WEIGHT: 255 LBS | DIASTOLIC BLOOD PRESSURE: 76 MMHG

## 2018-02-07 DIAGNOSIS — J01.90 ACUTE SINUSITIS, RECURRENCE NOT SPECIFIED, UNSPECIFIED LOCATION: Primary | ICD-10-CM

## 2018-02-07 RX ORDER — LOSARTAN POTASSIUM 50 MG/1
TABLET ORAL DAILY
COMMUNITY

## 2018-02-07 RX ORDER — AZELASTINE 1 MG/ML
1 SPRAY, METERED NASAL 2 TIMES DAILY
Qty: 1 BOTTLE | Refills: 0 | Status: SHIPPED | OUTPATIENT
Start: 2018-02-07 | End: 2018-04-23

## 2018-02-07 RX ORDER — PREDNISONE 10 MG/1
TABLET ORAL
Qty: 21 TAB | Refills: 0 | Status: SHIPPED | OUTPATIENT
Start: 2018-02-07 | End: 2018-04-23

## 2018-02-07 RX ORDER — AMOXICILLIN AND CLAVULANATE POTASSIUM 875; 125 MG/1; MG/1
1 TABLET, FILM COATED ORAL 2 TIMES DAILY
Qty: 20 TAB | Refills: 0 | Status: SHIPPED | OUTPATIENT
Start: 2018-02-07 | End: 2018-04-23

## 2018-02-07 NOTE — DISCHARGE INSTRUCTIONS
Saline Nasal Washes: Care Instructions  Your Care Instructions  Saline nasal washes help keep the nasal passages open by washing out thick or dried mucus. This simple remedy can help relieve symptoms of allergies, sinusitis, and colds. It also can make the nose feel more comfortable by keeping the mucous membranes moist. You may notice a little burning sensation in your nose the first few times you use the solution, but this usually gets better in a few days. Follow-up care is a key part of your treatment and safety. Be sure to make and go to all appointments, and call your doctor if you are having problems. It's also a good idea to know your test results and keep a list of the medicines you take. How can you care for yourself at home? · You can buy premixed saline solution in a squeeze bottle or other sinus rinse products at a drugstore. Read and follow the instructions on the label. · You also can make your own saline solution by adding 1 teaspoon of salt and 1 teaspoon of baking soda to 2 cups of distilled water. · If you use a homemade solution, pour a small amount into a clean bowl. Using a rubber bulb syringe, squeeze the syringe and place the tip in the salt water. Pull a small amount of the salt water into the syringe by relaxing your hand. · Sit down with your head tilted slightly back. Do not lie down. Put the tip of the bulb syringe or the squeeze bottle a little way into one of your nostrils. Gently drip or squirt a few drops into the nostril. Repeat with the other nostril. Some sneezing and gagging are normal at first.  · Gently blow your nose. · Wipe the syringe or bottle tip clean after each use. · Repeat this 2 or 3 times a day. · Use nasal washes gently if you have nosebleeds often. When should you call for help? Watch closely for changes in your health, and be sure to contact your doctor if:  ? · You often get nosebleeds. ? · You have problems doing the nasal washes.    Where can you learn more? Go to http://lorie-yuridia.info/. Enter [id number] [id number] [id number] in the search box to learn more about \"Saline Nasal Washes: Care Instructions. \"  Current as of: May 12, 2017  Content Version: 11.4  © 3126-4729 [employer]. Care instructions adapted under license by [employer] (which disclaims liability or warranty for this information). If you have questions about a medical condition or this instruction, always ask your healthcare professional. [address] any warranty or liability for your use of this information. Sinusitis: Care Instructions  Your Care Instructions    Sinusitis is an infection of the lining of the sinus cavities in your head. Sinusitis often follows a cold. It causes pain and pressure in your head and face. In most cases, sinusitis gets better on its own in 1 to 2 weeks. But some mild symptoms may last for several weeks. Sometimes antibiotics are needed. Follow-up care is a key part of your treatment and safety. Be sure to make and go to all appointments, and call your doctor if you are having problems. It's also a good idea to know your test results and keep a list of the medicines you take. How can you care for yourself at home? · Take an over-the-counter pain medicine, such as acetaminophen (Tylenol), ibuprofen (Advil, Motrin), or naproxen (Aleve). Read and follow all instructions on the label. · If the doctor prescribed antibiotics, take them as directed. Do not stop taking them just because you feel better. You need to take the full course of antibiotics. · Be careful when taking over-the-counter cold or flu medicines and Tylenol at the same time. Many of these medicines have acetaminophen, which is Tylenol. Read the labels to make sure that you are not taking more than the recommended dose. Too much acetaminophen (Tylenol) can be harmful.   · Breathe warm, moist air from a steamy shower, a hot bath, or a sink filled with hot water. Avoid cold, dry air. Using a humidifier in your home may help. Follow the directions for cleaning the machine. · Use saline (saltwater) nasal washes to help keep your nasal passages open and wash out mucus and bacteria. You can buy saline nose drops at a grocery store or drugstore. Or you can make your own at home by adding 1 teaspoon of salt and 1 teaspoon of baking soda to 2 cups of distilled water. If you make your own, fill a bulb syringe with the solution, insert the tip into your nostril, and squeeze gently. Jennifer  your nose. · Put a hot, wet towel or a warm gel pack on your face 3 or 4 times a day for 5 to 10 minutes each time. · Try a decongestant nasal spray like oxymetazoline (Afrin). Do not use it for more than 3 days in a row. Using it for more than 3 days can make your congestion worse. When should you call for help? Call your doctor now or seek immediate medical care if:  ? · You have new or worse swelling or redness in your face or around your eyes. ? · You have a new or higher fever. ? Watch closely for changes in your health, and be sure to contact your doctor if:  ? · You have new or worse facial pain. ? · The mucus from your nose becomes thicker (like pus) or has new blood in it. ? · You are not getting better as expected. Where can you learn more? Go to http://lorie-yuridia.info/. Enter W204 in the search box to learn more about \"Sinusitis: Care Instructions. \"  Current as of: May 12, 2017  Content Version: 11.4  © 5642-9371 [employer]. Care instructions adapted under license by [employer] (which disclaims liability or warranty for this information). If you have questions about a medical condition or this instruction, always ask your healthcare professional. [address] any warranty or liability for your use of this information.

## 2018-02-07 NOTE — UC PROVIDER NOTE
Patient is a 62 y.o. male presenting with cold symptoms. The history is provided by the patient. Cold Symptoms    This is a recurrent problem. The current episode started more than 1 week ago. The problem has not changed since onset. There has been no fever. Associated symptoms include congestion, rhinorrhea, sinus pain, sneezing and cough. Pertinent negatives include no chest pain, no nausea and no vomiting. He has tried nothing for the symptoms. Past Medical History:   Diagnosis Date    Depression 8/26/2014    Hypothyroidism 8/26/2014    Migraines 8/26/2014        Past Surgical History:   Procedure Laterality Date    HX APPENDECTOMY      HX HERNIA REPAIR      HX ORTHOPAEDIC      meniscus         History reviewed. No pertinent family history. Social History     Social History    Marital status:      Spouse name: N/A    Number of children: N/A    Years of education: N/A     Occupational History    Not on file. Social History Main Topics    Smoking status: Never Smoker    Smokeless tobacco: Former User    Alcohol use No    Drug use: No    Sexual activity: Not on file     Other Topics Concern    Not on file     Social History Narrative                ALLERGIES: Review of patient's allergies indicates no known allergies. Review of Systems   HENT: Positive for congestion, rhinorrhea, sinus pain and sneezing. Respiratory: Positive for cough. Cardiovascular: Negative for chest pain. Gastrointestinal: Negative for nausea and vomiting. All other systems reviewed and are negative. Vitals:    02/07/18 0844   BP: 132/76   Pulse: 77   Resp: 20   Temp: 98.2 °F (36.8 °C)   SpO2: 98%   Weight: 115.7 kg (255 lb)   Height: 5' 7\" (1.702 m)       Physical Exam   Constitutional: No distress.    HENT:   Right Ear: Tympanic membrane and ear canal normal.   Left Ear: Tympanic membrane and ear canal normal.   Nose: Nose normal.   Mouth/Throat: No oropharyngeal exudate, posterior oropharyngeal edema or posterior oropharyngeal erythema. Eyes: Conjunctivae are normal. Right eye exhibits no discharge. Left eye exhibits no discharge. Neck: Neck supple. Pulmonary/Chest: Effort normal and breath sounds normal. No respiratory distress. He has no wheezes. He has no rales. Lymphadenopathy:     He has no cervical adenopathy. Skin: No rash noted. Nursing note and vitals reviewed. MDM     Differential Diagnosis; Clinical Impression; Plan:     CLINICAL IMPRESSION:  Acute sinusitis, recurrence not specified, unspecified location  (primary encounter diagnosis)      DDX    Plan:    augmentin- astelin- prednisone  Stop flonase    Amount and/or Complexity of Data Reviewed:    Review and summarize past medical records:  Yes  Risk of Significant Complications, Morbidity, and/or Mortality:   Presenting problems: Moderate  Management options:   Moderate  Progress:   Patient progress:  Stable      Procedures

## 2018-04-23 ENCOUNTER — APPOINTMENT (OUTPATIENT)
Dept: CT IMAGING | Age: 58
End: 2018-04-23
Payer: COMMERCIAL

## 2018-04-23 ENCOUNTER — HOSPITAL ENCOUNTER (EMERGENCY)
Age: 58
Discharge: HOME | End: 2018-04-23
Attending: EMERGENCY MEDICINE | Admitting: EMERGENCY MEDICINE
Payer: COMMERCIAL

## 2018-04-23 VITALS
RESPIRATION RATE: 16 BRPM | OXYGEN SATURATION: 97 % | HEART RATE: 78 BPM | HEIGHT: 67 IN | BODY MASS INDEX: 36.99 KG/M2 | SYSTOLIC BLOOD PRESSURE: 135 MMHG | TEMPERATURE: 99.4 F | DIASTOLIC BLOOD PRESSURE: 77 MMHG | WEIGHT: 235.67 LBS

## 2018-04-23 DIAGNOSIS — R39.15 URINARY URGENCY: ICD-10-CM

## 2018-04-23 DIAGNOSIS — N28.0 RENAL INFARCT (HCC): Primary | ICD-10-CM

## 2018-04-23 DIAGNOSIS — N32.89 BLADDER MASS: ICD-10-CM

## 2018-04-23 DIAGNOSIS — L03.317 CELLULITIS OF BUTTOCK: ICD-10-CM

## 2018-04-23 LAB
ALBUMIN SERPL-MCNC: 3.8 G/DL (ref 3.5–5)
ALBUMIN/GLOB SERPL: 1 {RATIO} (ref 1.1–2.2)
ALP SERPL-CCNC: 66 U/L (ref 45–117)
ALT SERPL-CCNC: 37 U/L (ref 12–78)
ANION GAP SERPL CALC-SCNC: 6 MMOL/L (ref 5–15)
APPEARANCE UR: CLEAR
AST SERPL-CCNC: 19 U/L (ref 15–37)
BACTERIA URNS QL MICRO: NEGATIVE /HPF
BASOPHILS # BLD: 0.1 K/UL (ref 0–0.1)
BASOPHILS NFR BLD: 0 % (ref 0–1)
BILIRUB SERPL-MCNC: 0.5 MG/DL (ref 0.2–1)
BILIRUB UR QL: NEGATIVE
BUN SERPL-MCNC: 19 MG/DL (ref 6–20)
BUN/CREAT SERPL: 14 (ref 12–20)
CALCIUM SERPL-MCNC: 9 MG/DL (ref 8.5–10.1)
CHLORIDE SERPL-SCNC: 109 MMOL/L (ref 97–108)
CO2 SERPL-SCNC: 26 MMOL/L (ref 21–32)
COLOR UR: NORMAL
CREAT SERPL-MCNC: 1.4 MG/DL (ref 0.7–1.3)
DIFFERENTIAL METHOD BLD: ABNORMAL
EOSINOPHIL # BLD: 0.4 K/UL (ref 0–0.4)
EOSINOPHIL NFR BLD: 3 % (ref 0–7)
EPITH CASTS URNS QL MICRO: NORMAL /LPF
ERYTHROCYTE [DISTWIDTH] IN BLOOD BY AUTOMATED COUNT: 13.1 % (ref 11.5–14.5)
GLOBULIN SER CALC-MCNC: 3.8 G/DL (ref 2–4)
GLUCOSE SERPL-MCNC: 100 MG/DL (ref 65–100)
GLUCOSE UR STRIP.AUTO-MCNC: NEGATIVE MG/DL
HCT VFR BLD AUTO: 40 % (ref 36.6–50.3)
HGB BLD-MCNC: 14.2 G/DL (ref 12.1–17)
HGB UR QL STRIP: NEGATIVE
HYALINE CASTS URNS QL MICRO: NORMAL /LPF (ref 0–5)
IMM GRANULOCYTES # BLD: 0 K/UL (ref 0–0.04)
IMM GRANULOCYTES NFR BLD AUTO: 0 % (ref 0–0.5)
KETONES UR QL STRIP.AUTO: NEGATIVE MG/DL
LEUKOCYTE ESTERASE UR QL STRIP.AUTO: NEGATIVE
LYMPHOCYTES # BLD: 2.3 K/UL (ref 0.8–3.5)
LYMPHOCYTES NFR BLD: 17 % (ref 12–49)
MCH RBC QN AUTO: 32.6 PG (ref 26–34)
MCHC RBC AUTO-ENTMCNC: 35.5 G/DL (ref 30–36.5)
MCV RBC AUTO: 91.7 FL (ref 80–99)
MONOCYTES # BLD: 1 K/UL (ref 0–1)
MONOCYTES NFR BLD: 7 % (ref 5–13)
NEUTS SEG # BLD: 9.6 K/UL (ref 1.8–8)
NEUTS SEG NFR BLD: 73 % (ref 32–75)
NITRITE UR QL STRIP.AUTO: NEGATIVE
NRBC # BLD: 0 K/UL (ref 0–0.01)
NRBC BLD-RTO: 0 PER 100 WBC
PH UR STRIP: 6 [PH] (ref 5–8)
PLATELET # BLD AUTO: 230 K/UL (ref 150–400)
PMV BLD AUTO: 8.5 FL (ref 8.9–12.9)
POTASSIUM SERPL-SCNC: 4.1 MMOL/L (ref 3.5–5.1)
PROT SERPL-MCNC: 7.6 G/DL (ref 6.4–8.2)
PROT UR STRIP-MCNC: NEGATIVE MG/DL
RBC # BLD AUTO: 4.36 M/UL (ref 4.1–5.7)
RBC #/AREA URNS HPF: NORMAL /HPF (ref 0–5)
SODIUM SERPL-SCNC: 141 MMOL/L (ref 136–145)
SP GR UR REFRACTOMETRY: 1.02 (ref 1–1.03)
UA: UC IF INDICATED,UAUC: NORMAL
UROBILINOGEN UR QL STRIP.AUTO: 1 EU/DL (ref 0.2–1)
WBC # BLD AUTO: 13.3 K/UL (ref 4.1–11.1)
WBC URNS QL MICRO: NORMAL /HPF (ref 0–4)

## 2018-04-23 PROCEDURE — 74177 CT ABD & PELVIS W/CONTRAST: CPT

## 2018-04-23 PROCEDURE — 36415 COLL VENOUS BLD VENIPUNCTURE: CPT | Performed by: PHYSICIAN ASSISTANT

## 2018-04-23 PROCEDURE — 96365 THER/PROPH/DIAG IV INF INIT: CPT

## 2018-04-23 PROCEDURE — 74011250636 HC RX REV CODE- 250/636: Performed by: EMERGENCY MEDICINE

## 2018-04-23 PROCEDURE — 99283 EMERGENCY DEPT VISIT LOW MDM: CPT

## 2018-04-23 PROCEDURE — 81001 URINALYSIS AUTO W/SCOPE: CPT | Performed by: EMERGENCY MEDICINE

## 2018-04-23 PROCEDURE — 80053 COMPREHEN METABOLIC PANEL: CPT | Performed by: PHYSICIAN ASSISTANT

## 2018-04-23 PROCEDURE — 85025 COMPLETE CBC W/AUTO DIFF WBC: CPT | Performed by: PHYSICIAN ASSISTANT

## 2018-04-23 PROCEDURE — 74011636320 HC RX REV CODE- 636/320: Performed by: EMERGENCY MEDICINE

## 2018-04-23 RX ORDER — SODIUM CHLORIDE 0.9 % (FLUSH) 0.9 %
5-10 SYRINGE (ML) INJECTION EVERY 8 HOURS
Status: DISCONTINUED | OUTPATIENT
Start: 2018-04-23 | End: 2018-04-23 | Stop reason: HOSPADM

## 2018-04-23 RX ORDER — SODIUM CHLORIDE 0.9 % (FLUSH) 0.9 %
10 SYRINGE (ML) INJECTION
Status: COMPLETED | OUTPATIENT
Start: 2018-04-23 | End: 2018-04-23

## 2018-04-23 RX ORDER — SODIUM CHLORIDE 9 MG/ML
50 INJECTION, SOLUTION INTRAVENOUS
Status: COMPLETED | OUTPATIENT
Start: 2018-04-23 | End: 2018-04-23

## 2018-04-23 RX ORDER — CLINDAMYCIN HYDROCHLORIDE 300 MG/1
300 CAPSULE ORAL 4 TIMES DAILY
Qty: 40 CAP | Refills: 0 | Status: SHIPPED | OUTPATIENT
Start: 2018-04-23 | End: 2018-05-09 | Stop reason: ALTCHOICE

## 2018-04-23 RX ORDER — SODIUM CHLORIDE 0.9 % (FLUSH) 0.9 %
5-10 SYRINGE (ML) INJECTION AS NEEDED
Status: DISCONTINUED | OUTPATIENT
Start: 2018-04-23 | End: 2018-04-23 | Stop reason: HOSPADM

## 2018-04-23 RX ORDER — CLINDAMYCIN PHOSPHATE 600 MG/50ML
600 INJECTION INTRAVENOUS
Status: COMPLETED | OUTPATIENT
Start: 2018-04-23 | End: 2018-04-23

## 2018-04-23 RX ADMIN — Medication 10 ML: at 15:47

## 2018-04-23 RX ADMIN — SODIUM CHLORIDE 50 ML/HR: 900 INJECTION, SOLUTION INTRAVENOUS at 15:47

## 2018-04-23 RX ADMIN — IOPAMIDOL 100 ML: 755 INJECTION, SOLUTION INTRAVENOUS at 15:47

## 2018-04-23 RX ADMIN — CLINDAMYCIN PHOSPHATE 600 MG: 12 INJECTION, SOLUTION INTRAMUSCULAR; INTRAVENOUS at 17:13

## 2018-04-23 RX ADMIN — Medication 10 ML: at 17:16

## 2018-04-23 NOTE — ED NOTES
Received patient to exam room, sitting in a chair in a position of comfort, call bell within reach; pt reports \"urination problems\" since last week, states he was seen by his PCP with neg urine and was started on flomax, Saturday her reports lower back pain radiating to his right groin area, he also reports tenderness at his buttocks crease and thinks he may have another returning pilodinal cyst

## 2018-04-23 NOTE — ED TRIAGE NOTES
Assumed care of this patient. He is alert and oriented x4. Patient reports that he has been experiencing urgency with pain and difficulty urinating. Patient reporting lower abd pressure and pain in genitals x2. Patient states the pain is now radiating into the back, +nausea. He states that he has a scar on his buttock from previous cyst that is now tender.

## 2018-04-23 NOTE — ED NOTES
Patient discharged by Dr. Dick Terry. Patient provided with discharge instructions Rx and instructions on follow up care. Patient out of ED ambulatory accompanied by spouse.

## 2018-04-23 NOTE — ED PROVIDER NOTES
I have seen and evaluated this patient in the Express Care portion of triage for difficulty urinating x ~ 1 week, lower back pressure radiating into groin, and possible returning pilonidal cyst.  The patients care will begin now and orders have been placed. This patient will be seen and provided further care in the Emergency Room. Written by julisa Stoddard scribe for [facility]. 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 The pt reports associated sx of suprapubic and penile pressure / aching radiating to the diffuse lower back (right greater than left) and intermittent nausea as well. He expresses that he was seen by his PCP on 4/18 and after a negative prostate examination and UA he was discharged on Flomax for his sx. The pt discloses that he began taking the Flomax after the appointment with some relief in his urgency. He states that his back pain has worsened and is exacerbated with any movement noting there are no relieving factors leading him to the ED. He is also c/o a boil to his right superior buttock that is painful and swollen x2-3 days. The pt denies any h/o kidney stones but discloses a family h/o kidney stones.  He denies any fevers, chills, chest pain, SOB, vomiting, diarrhea, penile swelling, or testicular swelling. PCP: Gladys Reynoso MD    There are no other complaints, changes, or physical findings at this time. Current Facility-Administered Medications   Medication Dose Route Frequency Provider Last Rate Last Dose    sodium chloride (NS) flush 5-10 mL  5-10 mL IntraVENous [address] [place]        sodium chloride (NS) flush 5-10 mL  5-10 mL IntraVENous PRN [address] [place]         Current Outpatient Prescriptions   Medication Sig Dispense Refill    losartan (COZAAR) 50 mg tablet Take  by mouth daily.  mometasone (ASMANEX TWISTHALER) 220 mcg (120 doses) aepb inhaler Take  by inhalation.  fluticasone (FLONASE SENSIMIST) 27.5 mcg/actuation nasal spray 2 Sprays by Nasal route daily.  amoxicillin-clavulanate (AUGMENTIN) 875-125 mg per tablet Take 1 Tab by mouth two (2) times a day. 20 Tab 0    predniSONE (STERAPRED DS) 10 mg dose pack As directed 21 Tab 0    azelastine (ASTELIN) 137 mcg (0.1 %) nasal spray 1 [place] by Both Nostrils route two (2) times a day. Use in each nostril as directed 1 Bottle 0    phentermine (ADIPEX_P) 15 mg capsule Take 15 mg by mouth every morning.  esomeprazole (NEXIUM) 40 mg capsule Take  by mouth daily.  omega-3 fatty acids-vitamin e (FISH OIL) 1,000 mg cap Take 1 Cap by mouth.  zolpidem (AMBIEN) 10 mg tablet Take 5 mg by mouth nightly as needed for Sleep.  simvastatin (ZOCOR) 10 mg tablet Take  by mouth nightly.  sertraline (ZOLOFT) 50 mg tablet Take  by mouth daily.  levothyroxine (LEVOTHROID) 150 mcg tablet Take  by mouth Daily (before breakfast).  metformin (GLUCOPHAGE) 500 mg tablet Take  by mouth two (2) times daily (with meals).  topiramate (TOPAMAX) 100 mg tablet Take  by mouth two (2) times a day.          Past History     Past Medical History:  Past Medical History:   Diagnosis Date    Depression 8/26/2014    Hypothyroidism 8/26/2014    Migraines 8/26/2014       Past Surgical History:  Past Surgical History:   Procedure Laterality Date    HX APPENDECTOMY      HX HERNIA REPAIR      HX ORTHOPAEDIC      meniscus       Family History:  No family history on file. Social History:  Social History   Substance Use Topics    Smoking status: Never Smoker    Smokeless tobacco: Former User    Alcohol use No       Allergies:  No Known Allergies      Review of Systems   Review of Systems   Constitutional: Negative for chills and fever. Respiratory: Negative for cough and shortness of breath. Cardiovascular: Negative for chest pain. Gastrointestinal: Positive for abdominal pain (suprapubic radiating to diffuse lower back ) and nausea (intermittent ). Negative for constipation, diarrhea and vomiting. Genitourinary: Positive for decreased urine volume, difficulty urinating, penile pain and urgency. Negative for penile swelling and scrotal swelling. Musculoskeletal: Positive for back pain (diffuse lower (right greater than left) ). Skin: Positive for wound (boil right superior buttock ). Neurological: Negative for weakness and numbness. All other systems reviewed and are negative. Physical Exam   Physical Exam   Constitutional: He is oriented to person, place, and time. He appears well-developed and well-nourished. HENT:   Head: Normocephalic and atraumatic. Eyes: Conjunctivae and EOM are normal.   Neck: Normal range of motion. Neck supple. Cardiovascular: Normal rate and regular rhythm. Pulmonary/Chest: Effort normal and breath sounds normal. No respiratory distress. Abdominal: Soft. He exhibits no distension. There is no tenderness. There is no CVA tenderness. Genitourinary:   Genitourinary Comments: No groin tenderness   Musculoskeletal: Normal range of motion. Neurological: He is alert and oriented to person, place, and time. Skin: Skin is warm and dry. At right superior buttock there is a small area of induration, erythema, warmth, and tenderness, no fluctuance. Psychiatric: He has a normal mood and affect. Nursing note and vitals reviewed. Diagnostic Study Results     Labs -     Recent Results (from the past 12 hour(s))   URINALYSIS W/ REFLEX CULTURE    Collection Time: 04/23/18  1:44 PM   Result Value Ref Range    Color YELLOW/STRAW      Appearance CLEAR CLEAR      Specific gravity 1.024 1.003 - 1.030      pH (UA) 6.0 5.0 - 8.0      Protein NEGATIVE  NEG mg/dL    Glucose NEGATIVE  NEG mg/dL    Ketone NEGATIVE  NEG mg/dL    Bilirubin NEGATIVE  NEG      Blood NEGATIVE  NEG      Urobilinogen 1.0 0.2 - 1.0 EU/dL    Nitrites NEGATIVE  NEG      Leukocyte Esterase NEGATIVE  NEG      WBC 0-4 0 - 4 /hpf    RBC 0-5 0 - 5 /hpf    Epithelial cells FEW FEW /lpf    Bacteria NEGATIVE  NEG /hpf    UA:UC IF INDICATED CULTURE NOT INDICATED BY UA RESULT CNI      Hyaline cast 0-2 0 - 5 /lpf   CBC WITH AUTOMATED DIFF    Collection Time: 04/23/18  2:05 PM   Result Value Ref Range    WBC 13.3 (H) 4.1 - 11.1 K/uL    RBC 4.36 4.10 - 5.70 M/uL    HGB 14.2 12.1 - 17.0 g/dL    HCT 40.0 36.6 - 50.3 %    MCV 91.7 80.0 - 99.0 FL    MCH 32.6 26.0 - 34.0 PG    MCHC 35.5 30.0 - 36.5 g/dL    RDW 13.1 11.5 - 14.5 %    PLATELET 618 702 - 930 K/uL    MPV 8.5 (L) 8.9 - 12.9 FL    NRBC 0.0 0  WBC    ABSOLUTE NRBC 0.00 0.00 - 0.01 K/uL    NEUTROPHILS 73 32 - 75 %    LYMPHOCYTES 17 12 - 49 %    MONOCYTES 7 5 - 13 %    EOSINOPHILS 3 0 - 7 %    BASOPHILS 0 0 - 1 %    IMMATURE GRANULOCYTES 0 0.0 - 0.5 %    ABS. NEUTROPHILS 9.6 (H) 1.8 - 8.0 K/UL    ABS. LYMPHOCYTES 2.3 0.8 - 3.5 K/UL    ABS. MONOCYTES 1.0 0.0 - 1.0 K/UL    ABS. EOSINOPHILS 0.4 0.0 - 0.4 K/UL    ABS. BASOPHILS 0.1 0.0 - 0.1 K/UL    ABS. IMM.  GRANS. 0.0 0.00 - 0.04 K/UL    DF AUTOMATED     METABOLIC PANEL, COMPREHENSIVE    Collection Time: 04/23/18  2:05 PM   Result Value Ref Range    Sodium 141 136 - 145 mmol/L    Potassium 4.1 3.5 - 5.1 mmol/L    Chloride 109 (H) 97 - 108 mmol/L    CO2 26 21 - 32 mmol/L    Anion gap 6 5 - 15 mmol/L Glucose 100 65 - 100 mg/dL    BUN 19 6 - 20 MG/DL    Creatinine 1.40 (H) 0.70 - 1.30 MG/DL    BUN/Creatinine ratio 14 12 - 20      GFR est AA >60 >60 ml/min/1.73m2    GFR est non-AA 52 (L) >60 ml/min/1.73m2    Calcium 9.0 8.5 - 10.1 MG/DL    Bilirubin, total 0.5 0.2 - 1.0 MG/DL    ALT (SGPT) 37 12 - 78 U/L    AST (SGOT) 19 15 - 37 U/L    Alk. phosphatase 66 45 - 117 U/L    Protein, total 7.6 6.4 - 8.2 g/dL    Albumin 3.8 3.5 - 5.0 g/dL    Globulin 3.8 2.0 - 4.0 g/dL    A-G Ratio 1.0 (L) 1.1 - 2.2         Radiologic Studies -   CT Results  (Last 48 hours)               04/23/18 1547  CT ABD PELV W CONT Final result    Impression:  IMPRESSION:       1. Wedge-shaped area of hypodensity in the lower pole of the right kidney with   trace perirenal stranding most likely representing an infarct. 2. Hepatic steatosis. 3. Calcification in the posterior lateral right bladder wall. Narrative:  EXAM:  CT ABD PELV W CONT       INDICATION: Abdominal pain. Urinary issues. COMPARISON: None       CONTRAST:  100 mL of Isovue-370. TECHNIQUE:    Following the uneventful intravenous administration of contrast, thin axial   images were obtained through the abdomen and pelvis. Coronal and sagittal   reconstructions were generated. Oral contrast was not administered. CT dose   reduction was achieved through use of a standardized protocol tailored for this   examination and automatic exposure control for dose modulation. FINDINGS:    LUNG BASES: Clear. INCIDENTALLY IMAGED HEART AND MEDIASTINUM: Unremarkable. LIVER: The liver is hypodense related to hepatic steatosis. No mass or biliary   dilatation. GALLBLADDER: Unremarkable. SPLEEN: No mass. PANCREAS: No mass or ductal dilatation. ADRENALS: Unremarkable. KIDNEYS: There is a wedge-shaped area of hypodensity in the lower pole the right   kidney measuring 1.4 x 2.7 x 2.0 cm. There is slight adjacent perirenal   stranding.  The attenuation of this area measures 31 Hounsfield units. A 5 mm   hypodensity is present in the superior pole left kidney that is too small fully   catheterize, but likely represents a small cyst. No mass, calculus, or   hydronephrosis. STOMACH: Unremarkable. SMALL BOWEL: No dilatation or wall thickening. COLON: No dilatation or wall thickening. APPENDIX: Unremarkable. PERITONEUM: No ascites or pneumoperitoneum. RETROPERITONEUM: No lymphadenopathy or aortic aneurysm. REPRODUCTIVE ORGANS: Prosthetic conclusions are present. URINARY BLADDER: There is an 8 x 4 mm calcification in the posterior lateral   right-sided the bladder that appears associated with the wall. BONES: No destructive bone lesion. ADDITIONAL COMMENTS: N/A                     Medical Decision Making   I am the first provider for this patient. I reviewed the vital signs, available nursing notes, past medical history, past surgical history, family history and social history. Vital Signs-Reviewed the patient's vital signs. Patient Vitals for the past 12 hrs:   Temp Pulse Resp BP SpO2   04/23/18 1319 99.4 °F (37.4 °C) 78 16 135/77 97 %       Pulse Oximetry Analysis - 97% on room air    Cardiac Monitor:   Rate: 78 bpm  Rhythm: Normal Sinus Rhythm        Records Reviewed: Nursing Notes, Old Medical Records, Previous Radiology Studies and Previous Laboratory Studies    Provider Notes (Medical Decision Making):     DDx: kidney stone, BPH, Cancer, ARF, UTI. On separate complaint the pt also appears to have cellulitis of the buttock. There is no obvious abscess that needs I&D at this time. ED Course:   Initial assessment performed. The patients presenting problems have been discussed, and they are in agreement with the care plan formulated and outlined with them. I have encouraged them to ask questions as they arise throughout their visit.     Progress Notes:    CONSULT NOTE:  5:28 PM  Garry Denny M.D spoke with Harlee Hashimoto,  Specialty: Urology  Discussed pt's hx, disposition, and available diagnostic and imaging results. Reviewed care plans. Consultant recommends having the pt call the kidney stone hotline to set up an appointment for tomorrow. He may need a cystoscopy to rule out cancer. Consultant states if the pt remains afebrile he is safe for discharge. Written by Jerry Fitzgerald, ED Scribe, as dictated by Gregoria Le M.D    Critical Care Time: 0 minutes    Disposition:  Discharge Note:  5:55 PM  The patient is ready for discharge. The patient's signs, symptoms, diagnosis, and discharge instruction have been discussed and the patient has conveyed their understanding. The patient is to follow up as recommended or return to the ER should their symptoms worsen. Plan has been discussed and the patient is in agreement. Written by Jerry Fitzgerald ED Scribe, as dictated by Gregoria Le M.D    PLAN:  1. Current Discharge Medication List        2. Follow-up Information     Follow up With Details [address] [place] [address]  [phone]          Return to ED if worse     Diagnosis     Clinical Impression:   1. Renal infarct ([address]    2. Urinary urgency    3. Cellulitis of buttock    4. Bladder mass        Attestations:    Attestation: This note is prepared by Ron Fitzgerald, acting as Scribe for Gregoria Le M.D. Gregoria Le M.D: The scribe's documentation has been prepared under my direction and personally reviewed by me in its entirety. I confirm that the note above accurately reflects all work, treatment, procedures, and medical decision making performed by me.

## 2018-04-25 ENCOUNTER — OFFICE VISIT (OUTPATIENT)
Dept: SURGERY | Age: 58
End: 2018-04-25

## 2018-04-25 VITALS
TEMPERATURE: 97.5 F | HEART RATE: 67 BPM | BODY MASS INDEX: 37.28 KG/M2 | DIASTOLIC BLOOD PRESSURE: 69 MMHG | HEIGHT: 67 IN | WEIGHT: 237.5 LBS | OXYGEN SATURATION: 95 % | SYSTOLIC BLOOD PRESSURE: 115 MMHG

## 2018-04-25 DIAGNOSIS — L02.91 ABSCESS: Primary | ICD-10-CM

## 2018-04-25 RX ORDER — TAMSULOSIN HYDROCHLORIDE 0.4 MG/1
0.4 CAPSULE ORAL DAILY
COMMUNITY

## 2018-04-25 RX ORDER — LIDOCAINE HYDROCHLORIDE AND EPINEPHRINE 20; 10 MG/ML; UG/ML
10 INJECTION, SOLUTION INFILTRATION; PERINEURAL ONCE
Qty: 10 ML | Refills: 0
Start: 2018-04-25 | End: 2018-04-25

## 2018-04-25 NOTE — PROGRESS NOTES
[facility] - [address] [facility]  OFFICE PROCEDURE PROGRESS NOTE        Chart reviewed for the following:   Rick CORONADO LPN, have reviewed the History, Physical and updated the Allergic reactions for [address] performed immediately prior to start of procedure:   Radha Gardner LPN, have performed the following reviews on Diana Richardson prior to the start of the procedure:            * Patient was identified by name and date of birth   * Agreement on procedure being performed was verified  * Risks and Benefits explained to the patient  * Procedure site verified and marked as necessary  * Patient was positioned for comfort  * Consent was signed and verified     Time: 1300      Date of procedure: 4/25/2018    Procedure performed by:  Ela Mullen MD    Provider assisted by: CHARLA Bloom LPN    Patient assisted by: self    How tolerated by patient: Pt tolerated procedure well.     Post Procedural Pain Scale: 0/10    Comments: none

## 2018-04-25 NOTE — PROGRESS NOTES
1. Have you been to the ER, urgent care clinic since your last visit? Hospitalized since your last visit? New patient  2. Have you seen or consulted any other health care providers outside of the [address] [place] [address] since your last visit? Include any pap smears or colon screening. New patient. Discussed advanced directive. Patient states that he does not have an advanced directive.

## 2018-04-25 NOTE — PROGRESS NOTES
HISTORY OF PRESENT ILLNESS  Jordi Swann is a 62 y.o. male. HPI Comments:   Was in the ER a couple days ago with tailbone pain  Had a CT scan: right kidney, infract? Started on Abx for buttock abscess    Had cysto: bladder stones, prostate enlarged  Saw Dr. Johanna Suarez today    Taking clinda and cipro        ____________________________________________________________________________  Patient presents with:  Skin Problem: seen at the request of Dr. Johanna Suarez eval abscess on buttuck     /69 (BP 1 Location: Right arm, BP Patient Position: Sitting)  Pulse 67  Temp 97.5 °F (36.4 °C)  Ht 5' 7\" (1.702 m)  Wt 107.7 kg (237 lb 8 oz)  SpO2 95%  BMI 37.2 kg/m2  Past Medical History:  8/26/2014: Depression  8/26/2014: Hypothyroidism  8/26/2014: Migraines  Past Surgical History:  No date: HX APPENDECTOMY  No date: HX HERNIA REPAIR  No date: HX ORTHOPAEDIC      Comment: meniscus  Social History    Marital status:              Spouse name:                       Years of education:                 Number of children:               Social History Main Topics    Smoking status: Never Smoker                                                                Smokeless status: Former User                       Alcohol use: No              Drug use: No              Review of patient's family history indicates:    Cancer                         Mother                      Comment: lung    Cancer                         Father                      Comment: colon    Stroke                         Father                    Heart Disease                  Father                    Current Outpatient Prescriptions:  tamsulosin (FLOMAX) 0.4 mg capsule, Take 0.4 mg by mouth daily. GLYCINE PO, Take  by mouth. ASHWAGANDHA ROOT EXTRACT,BULK,, by Does Not Apply route. clindamycin (CLEOCIN) 300 mg capsule, Take 1 Cap by mouth four (4) times daily. losartan (COZAAR) 50 mg tablet, Take  by mouth daily.   mometasone [facility]) 220 mcg (120 doses) aepb inhaler, Take  by inhalation. phentermine (ADIPEX_P) 15 mg capsule, Take 15 mg by mouth every morning. esomeprazole (NEXIUM) 40 mg capsule, Take  by mouth daily. omega-3 fatty acids-vitamin e (FISH OIL) 1,000 mg cap, Take 1 Cap by mouth.  zolpidem (AMBIEN) 10 mg tablet, Take 5 mg by mouth nightly as needed for Sleep.  simvastatin (ZOCOR) 10 mg tablet, Take  by mouth nightly. sertraline (ZOLOFT) 50 mg tablet, Take  by mouth daily. levothyroxine (LEVOTHROID) 150 mcg tablet, Take  by mouth Daily (before breakfast). metformin (GLUCOPHAGE) 500 mg tablet, Take  by mouth two (2) times daily (with meals). topiramate (TOPAMAX) 100 mg tablet, Take  by mouth two (2) times a day. No current facility-administered medications for this visit. Allergies: No Known Allergies  _____________________________________________________________________________      Skin Problem   The history is provided by the patient. This is a new problem. The current episode started more than 2 days ago. The problem occurs constantly. The problem has not changed since onset. Pertinent negatives include no chest pain, no abdominal pain, no headaches and no shortness of breath. The symptoms are aggravated by twisting and bending. The treatment provided no relief. Review of Systems   Constitutional: Negative for chills, fever and weight loss. HENT: Negative for ear pain. Eyes: Negative for pain. Respiratory: Negative for shortness of breath. Cardiovascular: Negative for chest pain. Gastrointestinal: Negative for abdominal pain and blood in stool. Genitourinary: Negative for hematuria. Musculoskeletal: Negative for joint pain. Skin: Negative for rash. Neurological: Negative for dizziness, focal weakness, seizures and headaches. Endo/Heme/Allergies: Does not bruise/bleed easily. Psychiatric/Behavioral: The patient does not have insomnia.         Physical Exam   Constitutional: He is oriented to person, place, and time. He appears well-developed and well-nourished. No distress. HENT:   Head: Normocephalic. Mouth/Throat: No oropharyngeal exudate. Eyes: Pupils are equal, round, and reactive to light. No scleral icterus. Neck: Neck supple. No tracheal deviation present. Cardiovascular: Normal rate, regular rhythm and normal heart sounds. Pulmonary/Chest: Effort normal and breath sounds normal. He has no wheezes. Abdominal: Soft. He exhibits no distension and no mass. There is no hepatosplenomegaly. There is no tenderness. There is no CVA tenderness. No hernia. Musculoskeletal: Normal range of motion. He exhibits no edema. Lymphadenopathy:     He has no cervical adenopathy. Neurological: He is alert and oriented to person, place, and time. Skin: Skin is warm. No rash noted. No erythema. Psychiatric: He has a normal mood and affect. His behavior is normal.       ASSESSMENT and PLAN    ICD-10-CM ICD-9-CM    1. Abscess L02.91 682.9 AEROBIC BACTERIAL CULTURE      I had an extensive discussion with Jennifer Myles regarding the risks, benefits, and alternatives of proceeding with a incision and drainage of right buttock abscess. Risks of surgery including the risk of anesthesia, bleeding, infection, injury to underlying structures, recurrence, need for repeat or more extensive procedures, and the lack of symptomatic improvement were discussed and he is in agreement to proceed. He will complete his course of antibiotics. Will f/u on cx results. Wound care instructions were reviewed and provided in writing. Thank you for this consult. Procedure: Incision and drainage of complex abscess of the right buttocks      Procedure Details: The risks, benefits, and alternatives were explained and consent was obtained for the procedure. The area was sterile prepped and draped in the usual manner. 1% lidocaine with epinephrine was infiltrated into the skin overlying the abscess.   An incision was made. A Very large amount of pus was obtained. A culture was obtained. The loculations and crypts within the wound were broken up with a hemostat. The wound was packed with iodoform gauze. A sterile dressing was then applied. The patient tolerated the procedure well and was feeling better by the time he left the office. Wound care instructions were given.

## 2018-04-25 NOTE — MR AVS SNAPSHOT
[address], [address] [facility], [address] [place] [address] [facility] 
[phone] Patient: Jordi Swann MRN: [id number] PYF:[birth year] Visit Information Date & Time Provider Department Dept. Phone Encounter #  
 4/25/2018 12:10 PM Daniel Carrillo MD [facility] [id number] Your Appointments 5/9/2018  9:00 AM  
POST OP 10 MIN with Daniel Carrillo MD  
[facility] Dr. Sebas Manning ([facility]) Appt Note: I&D  
 [address], [address] [facility], [address] [phone]  
[address] [facility], [address] [facility] [address] [phone] Upcoming Health Maintenance Date Due Hepatitis C Screening 1960 DTaP/Tdap/Td series (1 - Tdap) 11/25/1981 FOBT Q 1 YEAR AGE 50-75 11/25/2010 Influenza Age 5 to Adult 8/1/2017 Allergies as of 4/25/2018  Review Complete On: 4/25/2018 By: Lenise Runner, LPN No Known Allergies Current Immunizations  Never Reviewed No immunizations on file. Not reviewed this visit You Were Diagnosed With   
  
 Codes Comments Abscess    -  Primary ICD-10-CM: L02.91 
ICD-9-CM: 473. 9 Vitals BP Pulse Temp Height(growth percentile) Weight(growth percentile) SpO2  
 115/69 (BP 1 Location: Right arm, BP Patient Position: Sitting) 67 97.5 °F (36.4 °C) 5' 7\" (1.702 m) 237 lb 8 oz (107.7 kg) 95% BMI Smoking Status 37.2 kg/m2 Never Smoker Vitals History BMI and BSA Data Body Mass Index Body Surface Area  
 37.2 kg/m 2 2.26 m 2 Preferred Pharmacy Pharmacy Name Phone [address] [place] [address] [phone] Your Updated Medication List  
  
   
This list is accurate as of 4/25/18  1:21 PM.  Always use your most recent med list.  
  
  
  
  
 AMBIEN 10 mg tablet Generic drug:  zolpidem Take 5 mg by mouth nightly as needed for Sleep. ASHWAGANDHA ROOT EXTRACT(BULK)  
by Does Not Apply route. ASMANEX TWISTHALER 220 mcg (120 doses) Aepb inhaler Generic drug:  mometasone Take  by inhalation. clindamycin 300 mg capsule Commonly known as:  CLEOCIN Take 1 Cap by mouth four (4) times daily. FISH OIL 1,000 mg Cap Generic drug:  omega-3 fatty acids-vitamin e Take 1 Cap by mouth. FLOMAX 0.4 mg capsule Generic drug:  tamsulosin Take 0.4 mg by mouth daily. GLYCINE PO Take  by mouth. LEVOTHROID 150 mcg tablet Generic drug:  levothyroxine Take  by mouth Daily (before breakfast). lidocaine-EPINEPHrine 2 %-1:100,000 injection Commonly known as:  XYLOCAINE 10 mL by IntraDERMal route once for 1 dose. Indications: ADMINISTRATION OF LOCAL ANESTHESIA, exp date 4/1/19. Lot# -[id number]  
  
 losartan 50 mg tablet Commonly known as:  COZAAR Take  by mouth daily. metFORMIN 500 mg tablet Commonly known as:  GLUCOPHAGE Take  by mouth two (2) times daily (with meals). NexIUM 40 mg capsule Generic drug:  esomeprazole Take  by mouth daily. phentermine 15 mg capsule Commonly known as:  ADIPEX_P Take 15 mg by mouth every morning. sertraline 50 mg tablet Commonly known as:  ZOLOFT Take  by mouth daily. simvastatin 10 mg tablet Commonly known as:  ZOCOR Take  by mouth nightly. TOPAMAX 100 mg tablet Generic drug:  topiramate Take  by mouth two (2) times a day. Patient Instructions **Wound Care: · Replace outer gauze with new dry gauze twice a day starting today. · Starting Friday, start pulling out the packing 1\" a day: 
· Pull the packing 1\" and trim the excess, leave a long tail so you don't loose the packing in the wound. · Continue to change the outer gauze twice a day · Pull the packing daily until the packing is completely out Introducing [facility] SERVICES! Dear Junnie Boast: Thank you for requesting a Tails.com account. Our records indicate that you already have an active Tails.com account. You can access your account anytime at [email or web address] Did you know that you can access your hospital and ER discharge instructions at any time in Tails.com? You can also review all of your test results from your hospital stay or ER visit. Additional Information If you have questions, please visit the Frequently Asked Questions section of the Tails.com website at [email or web address] Remember, Tails.com is NOT to be used for urgent needs. For medical emergencies, dial 911. Now available from your iPhone and Android! Please provide this summary of care documentation to your next provider. Your primary care clinician is listed as Beny Kelly. If you have any questions after today's visit, please call [phone].

## 2018-04-25 NOTE — PATIENT INSTRUCTIONS
**Wound Care:    · Replace outer gauze with new dry gauze twice a day starting today. · Starting Friday, start pulling out the packing 1\" a day:  · Pull the packing 1\" and trim the excess, leave a long tail so you don't loose the packing in the wound.   · Continue to change the outer gauze twice a day  · Pull the packing daily until the packing is completely out

## 2018-04-27 LAB — BACTERIA SPEC AEROBE CULT: NORMAL

## 2018-05-09 ENCOUNTER — OFFICE VISIT (OUTPATIENT)
Dept: SURGERY | Age: 58
End: 2018-05-09

## 2018-05-09 DIAGNOSIS — L72.3 SEBACEOUS CYST: Primary | ICD-10-CM

## 2018-05-09 RX ORDER — CHLORPHENIRAMINE MALEATE 4 MG
4 TABLET ORAL
COMMUNITY
End: 2020-12-14

## 2018-05-09 RX ORDER — MINERAL OIL
ENEMA (ML) RECTAL
COMMUNITY
End: 2020-12-14

## 2018-05-09 RX ORDER — LEVOTHYROXINE SODIUM 175 UG/1
175 TABLET ORAL
COMMUNITY
Start: 2018-03-28 | End: 2020-12-14

## 2018-05-09 RX ORDER — PSEUDOEPHEDRINE HCL 120 MG/1
TABLET, FILM COATED, EXTENDED RELEASE ORAL
COMMUNITY
End: 2020-12-14

## 2018-05-09 RX ORDER — MONTELUKAST SODIUM 10 MG/1
TABLET ORAL
COMMUNITY

## 2018-05-09 RX ORDER — FINASTERIDE 5 MG/1
TABLET, FILM COATED ORAL
COMMUNITY

## 2018-05-09 NOTE — PROGRESS NOTES
Chief Complaint   Patient presents with    Surgical Follow-up     I & D right buttock abcess 4/25/18       I&D site healing well    Hard to tell at this point if a cyst remains    Discussed what to look for    F/u 1-2 months if cyst remains to consider excision. Expressed understanding. I had an extensive and thorough discussion with Camila Flowers regarding current diagnosis and treatment recommendations. Total face-to-face time spent with him was 15 minutes with the majority spent with counseling and coordination of care.

## 2018-05-09 NOTE — MR AVS SNAPSHOT
[address], [address] [facility], [address] 
[phone] Patient: Rojas Morgan MRN: [id number] GPW:93/18/7724 Visit Information Date & Time Provider Department Dept. Phone Encounter #  
 5/9/2018  9:00 AM Yakelin Jordan MD [facility] [id number] Upcoming Health Maintenance Date Due Hepatitis C Screening 1960 DTaP/Tdap/Td series (1 - Tdap) 11/25/1981 FOBT Q 1 YEAR AGE 50-75 11/25/2010 Influenza Age 5 to Adult 8/1/2018 Allergies as of 5/9/2018  Review Complete On: 5/9/2018 By: Yakelin Jordan MD  
 No Known Allergies Current Immunizations  Never Reviewed No immunizations on file. Not reviewed this visit Vitals BP Pulse Temp Resp Height(growth percentile) Weight(growth percentile) (P) 125/77 (BP 1 Location: Right arm, BP Patient Position: Sitting) (P) 67 (P) 96.4 °F (35.8 °C) (Oral) (P) 18 (P) 5' 7\" (1.702 m) (P) 237 lb 8 oz (107.7 kg) SpO2 BMI Smoking Status (P) 96% (P) 37.2 kg/m2 Never Smoker BMI and BSA Data Body Mass Index Body Surface Area (P) 37.2 kg/m 2 (P) 2.26 m 2 Preferred Pharmacy Pharmacy Name Phone [address] [facility] [address] [place] [address] [phone] Your Updated Medication List  
  
   
This list is accurate as of 5/9/18  9:26 AM.  Always use your most recent med list.  
  
  
  
  
 AMBIEN 10 mg tablet Generic drug:  zolpidem Take 5 mg by mouth nightly as needed for Sleep. ASHWAGANDHA ROOT EXTRACT(BULK)  
by Does Not Apply route. ASMANEX TWISTHALER 220 mcg (120 doses) Aepb inhaler Generic drug:  mometasone Take  by inhalation. chlorpheniramine 4 mg tablet Commonly known as:  CHLOR-TRIMETRON Take 4 mg by mouth every six (6) hours as needed for Allergies. fexofenadine 180 mg tablet Commonly known as:  Vinod Plank  
 Take  by mouth. FISH OIL 1,000 mg Cap Generic drug:  omega-3 fatty acids-vitamin e Take 3 Caps by mouth. FLOMAX 0.4 mg capsule Generic drug:  tamsulosin Take 0.4 mg by mouth daily. GLYCINE PO Take  by mouth. * LEVOTHROID 150 mcg tablet Generic drug:  levothyroxine Take  by mouth Daily (before breakfast). * SYNTHROID 175 mcg tablet Generic drug:  levothyroxine Take 175 mcg by mouth Daily (before breakfast). losartan 50 mg tablet Commonly known as:  COZAAR Take  by mouth daily. metFORMIN 500 mg tablet Commonly known as:  GLUCOPHAGE Take  by mouth two (2) times daily (with meals). NexIUM 40 mg capsule Generic drug:  esomeprazole Take  by mouth daily. phentermine 15 mg capsule Commonly known as:  ADIPEX_P Take 15 mg by mouth every morning. PROSCAR 5 mg tablet Generic drug:  finasteride Take  by mouth.  
  
 pseudoephedrine  mg CR tablet Commonly known as:  SUDAFED Take  by mouth. sertraline 50 mg tablet Commonly known as:  ZOLOFT Take  by mouth daily. simvastatin 10 mg tablet Commonly known as:  ZOCOR Take  by mouth nightly. SINGULAIR 10 mg tablet Generic drug:  montelukast  
Take  by mouth. TOPAMAX 100 mg tablet Generic drug:  topiramate Take  by mouth two (2) times a day. * Notice: This list has 2 medication(s) that are the same as other medications prescribed for you. Read the directions carefully, and ask your doctor or other care provider to review them with you. Introducing [facility] HE[facility] SERVICES! Dear Vaibhav Alvarez: Thank you for requesting a Ahandyhand account. Our records indicate that you already have an active Ahandyhand account. You can access your account anytime at [email or web address] Did you know that you can access your hospital and ER discharge instructions at any time in Ahandyhand?   You can also review all of your test results from your hospital stay or ER visit. Additional Information If you have questions, please visit the Frequently Asked Questions section of the [employer] website at [email or web address] Remember, [employer] is NOT to be used for urgent needs. For medical emergencies, dial 911. Now available from your iPhone and Android! Please provide this summary of care documentation to your next provider. Your primary care clinician is listed as Lakisha Grove. If you have any questions after today's visit, please call [phone].

## 2018-05-09 NOTE — PROGRESS NOTES
Chief Complaint   Patient presents with    Surgical Follow-up     I & D right buttock abcess 4/25/18     1. Have you been to the ER, urgent care clinic since your last visit? Hospitalized since your last visit? No    2. Have you seen or consulted any other health care providers outside of the [facility] since your last visit? Include any pap smears or colon screening.  No

## 2018-08-23 ENCOUNTER — OFFICE VISIT (OUTPATIENT)
Dept: SURGERY | Age: 58
End: 2018-08-23

## 2018-08-23 VITALS
DIASTOLIC BLOOD PRESSURE: 81 MMHG | RESPIRATION RATE: 15 BRPM | SYSTOLIC BLOOD PRESSURE: 126 MMHG | HEIGHT: 67 IN | OXYGEN SATURATION: 99 % | TEMPERATURE: 98.1 F | WEIGHT: 236.8 LBS | HEART RATE: 70 BPM | BODY MASS INDEX: 37.17 KG/M2

## 2018-08-23 DIAGNOSIS — L02.91 ABSCESS: Primary | ICD-10-CM

## 2018-08-23 PROBLEM — E66.01 SEVERE OBESITY (BMI 35.0-39.9): Status: ACTIVE | Noted: 2018-08-23

## 2018-08-23 RX ORDER — TOPIRAMATE 50 MG/1
TABLET, FILM COATED ORAL
COMMUNITY
Start: 2018-07-10

## 2018-08-23 NOTE — PROGRESS NOTES
Chief Complaint   Patient presents with    Skin Problem     Possible reocurrance of abcess on buttocks     1. Have you been to the ER, urgent care clinic since your last visit? Hospitalized since your last visit? No    2. Have you seen or consulted any other health care providers outside of the [address] [place] [address] since your last visit? Include any pap smears or colon screening. No     Discussed advanced directive. Patient states that he does not have an advanced directive.

## 2018-08-23 NOTE — PROGRESS NOTES
Chief Complaint   Patient presents with    Skin Problem     Possible reocurrance of abcess on buttocks       Concerned about there area being raised  There is some scaring there. No fluctuance or cyst.    Currently does not need intervention. If a cyst reform he will let me know and we can excise it before it becomes infected. Would not do anything at the moment. Expressed understanding. I had an extensive and thorough discussion with Nichelle Ronan regarding current diagnosis and treatment recommendations. Total face-to-face time spent with him was 10 minutes with the majority spent with counseling and coordination of care.

## 2018-08-23 NOTE — MR AVS SNAPSHOT
[address], [address] 
[phone] Patient: Rajinder Torres MRN: [id number] NBY:71/[birth year] Visit Information Date & Time Provider Department Dept. Phone Encounter #  
 8/23/2018  3:10 PM Clare Martínez MD [facility]"" [id number] Upcoming Health Maintenance Date Due Hepatitis C Screening 1960 DTaP/Tdap/Td series (1 - Tdap) 11/25/1981 FOBT Q 1 YEAR AGE 50-75 11/25/2010 Influenza Age 5 to Adult 8/1/2018 Allergies as of 8/23/2018  Review Complete On: 8/23/2018 By: Clare Martínez MD  
 No Known Allergies Current Immunizations  Never Reviewed No immunizations on file. Not reviewed this visit Vitals BP Pulse Temp Resp Height(growth percentile) Weight(growth percentile) 126/81 (BP 1 Location: Left arm, BP Patient Position: Sitting) 70 98.1 °F (36.7 °C) (Oral) 15 5' 7\" (1.702 m) 236 lb 12.8 oz (107.4 kg) SpO2 BMI Smoking Status 99% 37.09 kg/m2 Never Smoker BMI and BSA Data Body Mass Index Body Surface Area 37.09 kg/m 2 2.25 m 2 Preferred Pharmacy Pharmacy Name Phone [address] [place] [phone] Your Updated Medication List  
  
   
This list is accurate as of 8/23/18  3:50 PM.  Always use your most recent med list.  
  
  
  
  
 AMBIEN 10 mg tablet Generic drug:  zolpidem Take 5 mg by mouth nightly as needed for Sleep. ASHWAGANDHA ROOT EXTRACT(BULK)  
by Does Not Apply route. ASMANEX TWISTHALER 220 mcg (120 doses) Aepb inhaler Generic drug:  mometasone Take  by inhalation. chlorpheniramine 4 mg tablet Commonly known as:  CHLOR-TRIMETRON Take 4 mg by mouth every six (6) hours as needed for Allergies. fexofenadine 180 mg tablet Commonly known as:  Olvin Paron Take  by mouth. FISH OIL 1,000 mg Cap Generic drug:  omega-3 fatty acids-vitamin e Take 3 Caps by mouth. FLOMAX 0.4 mg capsule Generic drug:  tamsulosin Take 0.4 mg by mouth daily. GLYCINE PO Take  by mouth. * LEVOTHROID 150 mcg tablet Generic drug:  levothyroxine Take  by mouth Daily (before breakfast). * SYNTHROID 175 mcg tablet Generic drug:  levothyroxine Take 175 mcg by mouth Daily (before breakfast). losartan 50 mg tablet Commonly known as:  COZAAR Take  by mouth daily. metFORMIN 500 mg tablet Commonly known as:  GLUCOPHAGE Take  by mouth two (2) times daily (with meals). NexIUM 40 mg capsule Generic drug:  esomeprazole Take  by mouth daily. phentermine 15 mg capsule Commonly known as:  ADIPEX_P Take 15 mg by mouth every morning. PROSCAR 5 mg tablet Generic drug:  finasteride Take  by mouth.  
  
 pseudoephedrine  mg CR tablet Commonly known as:  SUDAFED Take  by mouth. sertraline 50 mg tablet Commonly known as:  ZOLOFT Take  by mouth daily. simvastatin 10 mg tablet Commonly known as:  ZOCOR Take  by mouth nightly. SINGULAIR 10 mg tablet Generic drug:  montelukast  
Take  by mouth. * TOPAMAX 100 mg tablet Generic drug:  topiramate Take  by mouth two (2) times a day. * topiramate 50 mg tablet Commonly known as:  TOPAMAX * Notice: This list has 4 medication(s) that are the same as other medications prescribed for you. Read the directions carefully, and ask your doctor or other care provider to review them with you. Introducing [facility] & HEALTH SERVICES! Dear Itz Patrick: Thank you for requesting a [employer] account. Our records indicate that you already have an active [employer] account. You can access your account anytime at [email or web address] Did you know that you can access your hospital and ER discharge instructions at any time in [employer] Service? You can also review all of your test results from your hospital stay or ER visit. Additional Information If you have questions, please visit the Frequently Asked Questions section of the [employer] website at [email or web address] [email or web address] Remember, [employer] is NOT to be used for urgent needs. For medical emergencies, dial 911. Now available from your iPhone and Android! Please provide this summary of care documentation to your next provider. Your primary care clinician is listed as Tito Zuñiga. If you have any questions after today's visit, please call [phone].

## 2019-10-22 ENCOUNTER — HOSPITAL ENCOUNTER (OUTPATIENT)
Dept: MRI IMAGING | Age: 59
Discharge: HOME | End: 2019-10-22
Attending: OTOLARYNGOLOGY
Payer: COMMERCIAL

## 2019-10-22 DIAGNOSIS — H90.5 DEAFNESS, SENSORINEURAL: ICD-10-CM

## 2019-10-22 PROCEDURE — 70553 MRI BRAIN STEM W/O & W/DYE: CPT

## 2019-10-22 PROCEDURE — A9575 INJ GADOTERATE MEGLUMI 0.1ML: HCPCS | Performed by: RADIOLOGY

## 2019-10-22 PROCEDURE — 74011250636 HC RX REV CODE- 250/636: Performed by: RADIOLOGY

## 2019-10-22 RX ORDER — GADOTERATE MEGLUMINE 376.9 MG/ML
20 INJECTION INTRAVENOUS
Status: COMPLETED | OUTPATIENT
Start: 2019-10-22 | End: 2019-10-22

## 2019-10-22 RX ADMIN — GADOTERATE MEGLUMINE 20 ML: 376.9 INJECTION INTRAVENOUS at 19:05

## 2019-12-25 ENCOUNTER — HOSPITAL ENCOUNTER (EMERGENCY)
Age: 59
Discharge: HOME | End: 2019-12-25
Attending: EMERGENCY MEDICINE
Payer: COMMERCIAL

## 2019-12-25 VITALS
OXYGEN SATURATION: 98 % | RESPIRATION RATE: 16 BRPM | TEMPERATURE: 98.3 F | WEIGHT: 235.67 LBS | BODY MASS INDEX: 36.99 KG/M2 | HEIGHT: 67 IN | DIASTOLIC BLOOD PRESSURE: 83 MMHG | SYSTOLIC BLOOD PRESSURE: 139 MMHG | HEART RATE: 66 BPM

## 2019-12-25 DIAGNOSIS — G43.009 MIGRAINE WITHOUT AURA AND WITHOUT STATUS MIGRAINOSUS, NOT INTRACTABLE: Primary | ICD-10-CM

## 2019-12-25 DIAGNOSIS — N17.9 AKI (ACUTE KIDNEY INJURY) (HCC): ICD-10-CM

## 2019-12-25 LAB
ANION GAP SERPL CALC-SCNC: 3 MMOL/L (ref 5–15)
BUN SERPL-MCNC: 21 MG/DL (ref 6–20)
BUN/CREAT SERPL: 16 (ref 12–20)
CALCIUM SERPL-MCNC: 8.8 MG/DL (ref 8.5–10.1)
CHLORIDE SERPL-SCNC: 108 MMOL/L (ref 97–108)
CO2 SERPL-SCNC: 27 MMOL/L (ref 21–32)
CREAT SERPL-MCNC: 1.34 MG/DL (ref 0.7–1.3)
ERYTHROCYTE [DISTWIDTH] IN BLOOD BY AUTOMATED COUNT: 12.7 % (ref 11.5–14.5)
GLUCOSE BLD STRIP.AUTO-MCNC: 125 MG/DL (ref 65–100)
GLUCOSE SERPL-MCNC: 120 MG/DL (ref 65–100)
HCT VFR BLD AUTO: 43.4 % (ref 36.6–50.3)
HGB BLD-MCNC: 15.3 G/DL (ref 12.1–17)
MCH RBC QN AUTO: 32.8 PG (ref 26–34)
MCHC RBC AUTO-ENTMCNC: 35.3 G/DL (ref 30–36.5)
MCV RBC AUTO: 92.9 FL (ref 80–99)
NRBC # BLD: 0 K/UL (ref 0–0.01)
NRBC BLD-RTO: 0 PER 100 WBC
PLATELET # BLD AUTO: 205 K/UL (ref 150–400)
PMV BLD AUTO: 8.3 FL (ref 8.9–12.9)
POTASSIUM SERPL-SCNC: 4.1 MMOL/L (ref 3.5–5.1)
RBC # BLD AUTO: 4.67 M/UL (ref 4.1–5.7)
SERVICE CMNT-IMP: ABNORMAL
SODIUM SERPL-SCNC: 138 MMOL/L (ref 136–145)
WBC # BLD AUTO: 9.1 K/UL (ref 4.1–11.1)

## 2019-12-25 PROCEDURE — 80048 BASIC METABOLIC PNL TOTAL CA: CPT

## 2019-12-25 PROCEDURE — 74011250636 HC RX REV CODE- 250/636: Performed by: EMERGENCY MEDICINE

## 2019-12-25 PROCEDURE — 96374 THER/PROPH/DIAG INJ IV PUSH: CPT

## 2019-12-25 PROCEDURE — 74011000250 HC RX REV CODE- 250: Performed by: EMERGENCY MEDICINE

## 2019-12-25 PROCEDURE — 36415 COLL VENOUS BLD VENIPUNCTURE: CPT

## 2019-12-25 PROCEDURE — 99283 EMERGENCY DEPT VISIT LOW MDM: CPT

## 2019-12-25 PROCEDURE — 82962 GLUCOSE BLOOD TEST: CPT

## 2019-12-25 PROCEDURE — 96375 TX/PRO/DX INJ NEW DRUG ADDON: CPT

## 2019-12-25 PROCEDURE — 85027 COMPLETE CBC AUTOMATED: CPT

## 2019-12-25 RX ORDER — KETOROLAC TROMETHAMINE 30 MG/ML
15 INJECTION, SOLUTION INTRAMUSCULAR; INTRAVENOUS
Status: COMPLETED | OUTPATIENT
Start: 2019-12-25 | End: 2019-12-25

## 2019-12-25 RX ORDER — PROCHLORPERAZINE EDISYLATE 5 MG/ML
10 INJECTION INTRAMUSCULAR; INTRAVENOUS
Status: DISCONTINUED | OUTPATIENT
Start: 2019-12-25 | End: 2019-12-25

## 2019-12-25 RX ORDER — BUTALBITAL, ACETAMINOPHEN AND CAFFEINE 300; 40; 50 MG/1; MG/1; MG/1
1 CAPSULE ORAL
Qty: 20 CAP | Refills: 0 | Status: SHIPPED | OUTPATIENT
Start: 2019-12-25 | End: 2020-12-14

## 2019-12-25 RX ORDER — DIPHENHYDRAMINE HYDROCHLORIDE 50 MG/ML
25 INJECTION, SOLUTION INTRAMUSCULAR; INTRAVENOUS
Status: COMPLETED | OUTPATIENT
Start: 2019-12-25 | End: 2019-12-25

## 2019-12-25 RX ADMIN — SODIUM CHLORIDE 1000 ML: 900 INJECTION, SOLUTION INTRAVENOUS at 12:04

## 2019-12-25 RX ADMIN — SODIUM CHLORIDE 10 MG: 9 INJECTION INTRAMUSCULAR; INTRAVENOUS; SUBCUTANEOUS at 12:04

## 2019-12-25 RX ADMIN — DIPHENHYDRAMINE HYDROCHLORIDE 25 MG: 50 INJECTION, SOLUTION INTRAMUSCULAR; INTRAVENOUS at 12:05

## 2019-12-25 RX ADMIN — KETOROLAC TROMETHAMINE 15 MG: 30 INJECTION, SOLUTION INTRAMUSCULAR at 12:05
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
## 2019-12-25 NOTE — DISCHARGE INSTRUCTIONS
Patient Education        Migraine Headache: Care Instructions  Your Care Instructions  Migraines are painful, throbbing headaches that often start on one side of the head. They may cause nausea and vomiting and make you sensitive to light, sound, or smell. Without treatment, migraines can last from 4 hours to a few days. Medicines can help prevent migraines or stop them after they have started. Your doctor can help you find which ones work best for you. Follow-up care is a key part of your treatment and safety. Be sure to make and go to all appointments, and call your doctor if you are having problems. It's also a good idea to know your test results and keep a list of the medicines you take. How can you care for yourself at home? · Do not drive if you have taken a prescription pain medicine. · Rest in a quiet, dark room until your headache is gone. Close your eyes, and try to relax or go to sleep. Don't watch TV or read. · Put a cold, moist cloth or cold pack on the painful area for 10 to 20 minutes at a time. Put a thin cloth between the cold pack and your skin. · Use a warm, moist towel or a heating pad set on low to relax tight shoulder and neck muscles. · Have someone gently massage your neck and shoulders. · Take your medicines exactly as prescribed. Call your doctor if you think you are having a problem with your medicine. You will get more details on the specific medicines your doctor prescribes. · Be careful not to take pain medicine more often than the instructions allow. You could get worse or more frequent headaches when the medicine wears off. To prevent migraines  · Keep a headache diary so you can figure out what triggers your headaches. Avoiding triggers may help you prevent headaches. Record when each headache began, how long it lasted, and what the pain was like.  (Was it throbbing, aching, stabbing, or dull?) Write down any other symptoms you had with the headache, such as nausea, flashing lights or dark spots, or sensitivity to bright light or loud noise. Note if the headache occurred near your period. List anything that might have triggered the headache. Triggers may include certain foods (chocolate, cheese, wine) or odors, smoke, bright light, stress, or lack of sleep. · If your doctor has prescribed medicine for your migraines, take it as directed. You may have medicine that you take only when you get a migraine and medicine that you take all the time to help prevent migraines. ? If your doctor has prescribed medicine for when you get a headache, take it at the first sign of a migraine, unless your doctor has given you other instructions. ? If your doctor has prescribed medicine to prevent migraines, take it exactly as prescribed. Call your doctor if you think you are having a problem with your medicine. · Find healthy ways to deal with stress. Migraines are most common during or right after stressful times. Take time to relax before and after you do something that has caused a migraine in the past.  · Try to keep your muscles relaxed by keeping good posture. Check your jaw, face, neck, and shoulder muscles for tension. Try to relax them. When you sit at a desk, change positions often. And make sure to stretch for 30 seconds each hour. · Get plenty of sleep and exercise. · Eat meals on a regular schedule. Avoid foods and drinks that often trigger migraines. These include chocolate, alcohol (especially red wine and port), aspartame, monosodium glutamate (MSG), and some additives found in foods (such as hot dogs, wheat, cold cuts, aged cheeses, and pickled foods). · Limit caffeine. Don't drink too much coffee, tea, or soda. But don't quit caffeine suddenly. That can also give you migraines. · Do not smoke or allow others to smoke around you. If you need help quitting, talk to your doctor about stop-smoking programs and medicines.  These can increase your chances of quitting for good.  · If you are taking birth control pills or hormone therapy, talk to your doctor about whether they are triggering your migraines. When should you call for help? Call 911 anytime you think you may need emergency care. For example, call if:    · You have signs of a stroke. These may include:  ? Sudden numbness, paralysis, or weakness in your face, arm, or leg, especially on only one side of your body. ? Sudden vision changes. ? Sudden trouble speaking. ? Sudden confusion or trouble understanding simple statements. ? Sudden problems with walking or balance. ? A sudden, severe headache that is different from past headaches.    Call your doctor now or seek immediate medical care if:    · You have new or worse nausea and vomiting.     · You have a new or higher fever.     · Your headache gets much worse.    Watch closely for changes in your health, and be sure to contact your doctor if:    · You are not getting better after 2 days (48 hours). Where can you learn more? Go to http://lorie-yuridia.info/. Enter V157 in the search box to learn more about \"Migraine Headache: Care Instructions. \"  Current as of: March 28, 2019  Content Version: 12.2  © 8755-6772 [employer]. Care instructions adapted under license by [employer] (which disclaims liability or warranty for this information). If you have questions about a medical condition or this instruction, always ask your healthcare professional. [place] [address] any warranty or liability for your use of this information.

## 2019-12-25 NOTE — ED NOTES
1140: Rojas Roach MD at bedside. Patient presents to the ED ambulatory accompanied by his wife complaining of a migraine and blurred vision. Patient reports this Patient acknowledges using his prescribed Topamax and OTC Motrin with no relief. Patient denies dizziness, nausea or vomiting. 1147: PCT at bedside obtaining glucose. 1251: Patient provided with AVS & signature obtained. Patient plans to d/c after IV fluids complete. 1321: MD Rojas Roach reviewed discharge instructions with the patient and spouse. The patient and spous verbalized understanding. Patient discharged home with stable vitals. Patient out of ED with discharge instructions in hand. Opportunity for questions and clarification provided. No further complaints noted at this time.

## 2020-08-21 ENCOUNTER — HOSPITAL ENCOUNTER (OUTPATIENT)
Dept: ULTRASOUND IMAGING | Age: 60
Discharge: HOME | End: 2020-08-21
Attending: FAMILY MEDICINE
Payer: COMMERCIAL

## 2020-08-21 DIAGNOSIS — R60.9 EDEMA: ICD-10-CM

## 2020-08-21 DIAGNOSIS — M79.604 RIGHT LEG PAIN: ICD-10-CM

## 2020-08-21 PROCEDURE — 93971 EXTREMITY STUDY: CPT

## 2020-12-14 ENCOUNTER — OFFICE VISIT (OUTPATIENT)
Dept: URGENT CARE | Age: 60
End: 2020-12-14
Payer: COMMERCIAL

## 2020-12-14 VITALS — HEART RATE: 67 BPM | OXYGEN SATURATION: 95 % | RESPIRATION RATE: 18 BRPM | TEMPERATURE: 98.7 F

## 2020-12-14 DIAGNOSIS — Z20.822 ENCOUNTER FOR LABORATORY TESTING FOR COVID-19 VIRUS: Primary | ICD-10-CM

## 2020-12-14 PROCEDURE — 99202 OFFICE O/P NEW SF 15 MIN: CPT | Performed by: NURSE PRACTITIONER

## 2020-12-14 NOTE — PROGRESS NOTES
Subjective: (As above and below)       This patient was seen in [facility] at [address] [place] [address] Urgent Care while in their vehicle due to COVID-19 pandemic with PPE and focused examination in order to decrease community viral transmission. The patient/guardian gave verbal consent to treat. Chief Complaint   Patient presents with    Covid Testing     Pt needs COVID 19 test for travel     Monalisa Padron is a 61 y.o. male who presents for COVID-19 testing  Required for: travel  Currently has not tried any therapies and denies any symptoms at this point in time. Feels well otherwise. Recent travel: no  Known Exposure to COVID-19: no  Known flu or strep contact: no         ROS- negative for dizziness, cough, SOB, rhinorrhea, myalgias, n/v/d, rashes, headaches, fevers, chills, chest pains. Review of Systems - negative except as listed above    Reviewed PmHx, RxHx, FmHx, SocHx, AllgHx and updated in chart. Family History   Problem Relation Age of Onset    Cancer Mother         lung    Cancer Father         colon    Stroke Father     Heart Disease Father        Past Medical History:   Diagnosis Date    Depression 8/26/2014    Hypothyroidism 8/26/2014    Migraines 8/26/2014      Social History     Socioeconomic History    Marital status:      Spouse name: Not on file    Number of children: Not on file    Years of education: Not on file    Highest education level: Not on file   Tobacco Use    Smoking status: Never Smoker    Smokeless tobacco: Former User   Substance and Sexual Activity    Alcohol use: No    Drug use: No          Current Outpatient Medications   Medication Sig    topiramate (TOPAMAX) 50 mg tablet     finasteride (PROSCAR) 5 mg tablet Take  by mouth.  montelukast (SINGULAIR) 10 mg tablet Take  by mouth.  tamsulosin (FLOMAX) 0.4 mg capsule Take 0.4 mg by mouth daily.  ASHWAGANDHA ROOT EXTRACT,BULK, by Does Not Apply route.     losartan (COZAAR) 50 mg tablet Take  by mouth daily.  mometasone (ASMANEX TWISTHALER) 220 mcg (120 doses) aepb inhaler Take  by inhalation.  esomeprazole (NEXIUM) 40 mg capsule Take  by mouth daily.  omega-3 fatty acids-vitamin e (FISH OIL) 1,000 mg cap Take 3 Caps by mouth.  zolpidem (AMBIEN) 10 mg tablet Take 5 mg by mouth nightly as needed for Sleep.  simvastatin (ZOCOR) 10 mg tablet Take  by mouth nightly.  sertraline (ZOLOFT) 50 mg tablet Take  by mouth daily.  levothyroxine (LEVOTHROID) 150 mcg tablet Take  by mouth Daily (before breakfast).  metformin (GLUCOPHAGE) 500 mg tablet Take  by mouth two (2) times daily (with meals). No current facility-administered medications for this visit. Objective:     Vitals:    12/14/20 1604   Pulse: 67   Resp: 18   Temp: 98.7 °F (37.1 °C)   SpO2: 95%       Physical Exam  General appearance  appears well hydrated and does not appear toxic, no acute distress  Eyes - EOMs intact. Non injected. No scleral icterus   Ears - no external swelling  Neck/Lymphatics  trachea midline, full AROM  Chest - normal breathing effort. No active cough heard. No audible wheezing. Heart - HR-see vitals  Skin - no observable rashes or pallor  Neurologic- alert and oriented x 3  Psychiatric- normal mood, behavior and though content. Assessment/ Plan:     1. Encounter for laboratory testing for COVID-19 virus    - NOVEL CORONAVIRUS (COVID-19)      Will test for COVID  Supportive home care reviewed for any development of mild symptoms - tylenol, maintain adequate fluid intake, deep breathing exercises  Self isolate/quarantine advised based on symptoms/lab results as recommended in current CDC guidelines.            Joby Pepe, ARMAAN

## 2020-12-16 LAB — SARS-COV-2, NAA: NOT DETECTED

## 2022-03-19 PROBLEM — R07.9 CHEST PAIN: Status: ACTIVE | Noted: 2017-07-16

## 2022-03-19 PROBLEM — I24.9 ACS (ACUTE CORONARY SYNDROME) (HCC): Status: ACTIVE | Noted: 2017-07-15

## 2024-07-29 ENCOUNTER — OFFICE VISIT (OUTPATIENT)
Age: 64
End: 2024-07-29

## 2024-07-29 VITALS
DIASTOLIC BLOOD PRESSURE: 85 MMHG | SYSTOLIC BLOOD PRESSURE: 135 MMHG | WEIGHT: 180 LBS | OXYGEN SATURATION: 96 % | TEMPERATURE: 98.3 F | RESPIRATION RATE: 17 BRPM | HEART RATE: 68 BPM

## 2024-07-29 DIAGNOSIS — T63.441A LOCAL REACTION TO BEE STING, ACCIDENTAL OR UNINTENTIONAL, INITIAL ENCOUNTER: Primary | ICD-10-CM

## 2024-07-29 RX ORDER — TRIAMCINOLONE ACETONIDE 1 MG/G
CREAM TOPICAL
Qty: 45 G | Refills: 0 | Status: SHIPPED | OUTPATIENT
Start: 2024-07-29

## 2024-07-29 NOTE — PROGRESS NOTES
Zhen Mcgowan (:  1960) is a 63 y.o. male,New patient, here for evaluation of the following chief complaint(s):  Insect Bite (Pt was trimming bushes yesterday and was stung by something on the back on his ankle, py put ice pack on it and also took tylenol and motrin area was slightly swollen.  )      Assessment & Plan :  Visit Diagnoses and Associated Orders       Local reaction to bee sting, accidental or unintentional, initial encounter    -  Primary    triamcinolone (KENALOG) 0.1 % cream [7413]                 Patient was seen today for a possible insect sting to his left Achilles tendon  His vital signs are stable, physical exam is benign, I have low suspicion for infection at this time  I suspect this is just a localized reaction to what appears to be a bee sting  Will treat with topical corticosteroids, triamcinolone applied topically twice daily for the next several days  Continue taking your Xyzal during the day for itch relief  Apply ice to the area for comfort  Observe the area carefully over the next several days and observe for signs of infection, these include fevers, chills, rapidly spreading redness/swelling or purulent drainage  Return if these develop       Subjective :    Insect Bite       63 y.o. male presents with symptoms of possible insect sting to left posterior lower leg.  States he was outside yesterday doing some hedge trimming and felt a sharp sting in his leg.  The area was initially very painful, with throbbing pain to the back of his calf and Achilles tendon which persisted through the night last night.  There was initially some redness and swelling which has come down slightly today.  He denies any severe or anaphylactic past reactions to bee stings.  Denies any fevers, chills, body aches or fatigue.  Denies any drainage or discharge from the affected area.  Of note, he is diabetic and states blood sugars are not well-controlled, last A1c somewhere above 8%.  Applying ice,

## 2024-07-29 NOTE — PATIENT INSTRUCTIONS
Patient was seen today for a possible insect sting to his left Achilles tendon  His vital signs are stable, physical exam is benign, I have low suspicion for infection at this time  I suspect this is just a localized reaction to what appears to be a bee sting  Will treat with topical corticosteroids, triamcinolone applied topically twice daily for the next several days  Continue taking your Xyzal during the day for itch relief  Apply ice to the area for comfort  Observe the area carefully over the next several days and observe for signs of infection, these include fevers, chills, rapidly spreading redness/swelling or purulent drainage  Return if these develop

## 2025-07-13 ENCOUNTER — HOSPITAL ENCOUNTER (EMERGENCY)
Facility: HOSPITAL | Age: 65
Discharge: HOME | End: 2025-07-14
Attending: EMERGENCY MEDICINE
Payer: COMMERCIAL

## 2025-07-13 VITALS
WEIGHT: 180 LBS | HEIGHT: 67 IN | DIASTOLIC BLOOD PRESSURE: 96 MMHG | OXYGEN SATURATION: 99 % | RESPIRATION RATE: 20 BRPM | TEMPERATURE: 98.2 F | SYSTOLIC BLOOD PRESSURE: 141 MMHG | HEART RATE: 63 BPM | BODY MASS INDEX: 28.25 KG/M2

## 2025-07-13 DIAGNOSIS — H81.10 BENIGN PAROXYSMAL POSITIONAL VERTIGO, UNSPECIFIED LATERALITY: Primary | ICD-10-CM

## 2025-07-13 LAB
ALBUMIN SERPL-MCNC: 3.7 G/DL (ref 3.5–5)
ALBUMIN/GLOB SERPL: 1 (ref 1.1–2.2)
ALP SERPL-CCNC: 112 U/L (ref 45–117)
ALT SERPL-CCNC: 31 U/L (ref 12–78)
ANION GAP SERPL CALC-SCNC: 3 MMOL/L (ref 2–12)
APPEARANCE UR: CLEAR
AST SERPL-CCNC: 16 U/L (ref 15–37)
BACTERIA URNS QL MICRO: NEGATIVE /HPF
BASOPHILS # BLD: 0.06 K/UL (ref 0–0.1)
BASOPHILS NFR BLD: 0.8 % (ref 0–1)
BILIRUB SERPL-MCNC: 0.4 MG/DL (ref 0.2–1)
BILIRUB UR QL: NEGATIVE
BUN SERPL-MCNC: 18 MG/DL (ref 6–20)
BUN/CREAT SERPL: 15 (ref 12–20)
CALCIUM SERPL-MCNC: 9.5 MG/DL (ref 8.5–10.1)
CHLORIDE SERPL-SCNC: 101 MMOL/L (ref 97–108)
CO2 SERPL-SCNC: 30 MMOL/L (ref 21–32)
COLOR UR: ABNORMAL
COMMENT:: NORMAL
CREAT SERPL-MCNC: 1.18 MG/DL (ref 0.7–1.3)
DIFFERENTIAL METHOD BLD: ABNORMAL
EKG ATRIAL RATE: 59 BPM
EKG DIAGNOSIS: NORMAL
EKG P AXIS: 29 DEGREES
EKG P-R INTERVAL: 150 MS
EKG Q-T INTERVAL: 404 MS
EKG QRS DURATION: 80 MS
EKG QTC CALCULATION (BAZETT): 399 MS
EKG R AXIS: 18 DEGREES
EKG T AXIS: 83 DEGREES
EKG VENTRICULAR RATE: 59 BPM
EOSINOPHIL # BLD: 0.29 K/UL (ref 0–0.4)
EOSINOPHIL NFR BLD: 3.7 % (ref 0–7)
EPITH CASTS URNS QL MICRO: ABNORMAL /LPF
ERYTHROCYTE [DISTWIDTH] IN BLOOD BY AUTOMATED COUNT: 13.1 % (ref 11.5–14.5)
GLOBULIN SER CALC-MCNC: 3.6 G/DL (ref 2–4)
GLUCOSE BLD STRIP.AUTO-MCNC: 220 MG/DL (ref 65–117)
GLUCOSE SERPL-MCNC: 215 MG/DL (ref 65–100)
GLUCOSE UR STRIP.AUTO-MCNC: >1000 MG/DL
HCT VFR BLD AUTO: 45.3 % (ref 36.6–50.3)
HGB BLD-MCNC: 15.5 G/DL (ref 12.1–17)
HGB UR QL STRIP: NEGATIVE
HYALINE CASTS URNS QL MICRO: ABNORMAL /LPF (ref 0–2)
IMM GRANULOCYTES # BLD AUTO: 0.02 K/UL (ref 0–0.04)
IMM GRANULOCYTES NFR BLD AUTO: 0.3 % (ref 0–0.5)
KETONES UR QL STRIP.AUTO: NEGATIVE MG/DL
LEUKOCYTE ESTERASE UR QL STRIP.AUTO: NEGATIVE
LYMPHOCYTES # BLD: 2.28 K/UL (ref 0.8–3.5)
LYMPHOCYTES NFR BLD: 29.2 % (ref 12–49)
MCH RBC QN AUTO: 32.1 PG (ref 26–34)
MCHC RBC AUTO-ENTMCNC: 34.2 G/DL (ref 30–36.5)
MCV RBC AUTO: 93.8 FL (ref 80–99)
MONOCYTES # BLD: 0.53 K/UL (ref 0–1)
MONOCYTES NFR BLD: 6.8 % (ref 5–13)
NEUTS SEG # BLD: 4.62 K/UL (ref 1.8–8)
NEUTS SEG NFR BLD: 59.2 % (ref 32–75)
NITRITE UR QL STRIP.AUTO: NEGATIVE
NRBC # BLD: 0 K/UL (ref 0–0.01)
NRBC BLD-RTO: 0 PER 100 WBC
PH UR STRIP: 7.5 (ref 5–8)
PLATELET # BLD AUTO: 219 K/UL (ref 150–400)
PMV BLD AUTO: 8.3 FL (ref 8.9–12.9)
POTASSIUM SERPL-SCNC: 3.9 MMOL/L (ref 3.5–5.1)
PROT SERPL-MCNC: 7.3 G/DL (ref 6.4–8.2)
PROT UR STRIP-MCNC: NEGATIVE MG/DL
RBC # BLD AUTO: 4.83 M/UL (ref 4.1–5.7)
RBC #/AREA URNS HPF: ABNORMAL /HPF (ref 0–5)
SERVICE CMNT-IMP: ABNORMAL
SODIUM SERPL-SCNC: 134 MMOL/L (ref 136–145)
SP GR UR REFRACTOMETRY: 1.02
SPECIMEN HOLD: NORMAL
TROPONIN I SERPL HS-MCNC: 12 NG/L (ref 0–76)
URINE CULTURE IF INDICATED: ABNORMAL
UROBILINOGEN UR QL STRIP.AUTO: 0.2 EU/DL (ref 0.2–1)
WBC # BLD AUTO: 7.8 K/UL (ref 4.1–11.1)
WBC URNS QL MICRO: ABNORMAL /HPF (ref 0–4)

## 2025-07-13 PROCEDURE — 84484 ASSAY OF TROPONIN QUANT: CPT

## 2025-07-13 PROCEDURE — 2580000003 HC RX 258: Performed by: EMERGENCY MEDICINE

## 2025-07-13 PROCEDURE — 85025 COMPLETE CBC W/AUTO DIFF WBC: CPT

## 2025-07-13 PROCEDURE — 80053 COMPREHEN METABOLIC PANEL: CPT

## 2025-07-13 PROCEDURE — 36415 COLL VENOUS BLD VENIPUNCTURE: CPT

## 2025-07-13 PROCEDURE — 99284 EMERGENCY DEPT VISIT MOD MDM: CPT

## 2025-07-13 PROCEDURE — 6370000000 HC RX 637 (ALT 250 FOR IP): Performed by: EMERGENCY MEDICINE

## 2025-07-13 PROCEDURE — 93005 ELECTROCARDIOGRAM TRACING: CPT | Performed by: EMERGENCY MEDICINE

## 2025-07-13 PROCEDURE — 81001 URINALYSIS AUTO W/SCOPE: CPT

## 2025-07-13 PROCEDURE — 82962 GLUCOSE BLOOD TEST: CPT

## 2025-07-13 RX ORDER — MECLIZINE HYDROCHLORIDE 25 MG/1
25 TABLET ORAL 3 TIMES DAILY PRN
Qty: 15 TABLET | Refills: 0 | Status: SHIPPED | OUTPATIENT
Start: 2025-07-13 | End: 2025-07-23

## 2025-07-13 RX ORDER — MECLIZINE HCL 12.5 MG 12.5 MG/1
25 TABLET ORAL 3 TIMES DAILY PRN
Status: DISCONTINUED | OUTPATIENT
Start: 2025-07-13 | End: 2025-07-14 | Stop reason: HOSPADM

## 2025-07-13 RX ORDER — 0.9 % SODIUM CHLORIDE 0.9 %
1000 INTRAVENOUS SOLUTION INTRAVENOUS ONCE
Status: COMPLETED | OUTPATIENT
Start: 2025-07-13 | End: 2025-07-14

## 2025-07-13 RX ADMIN — MECLIZINE 25 MG: 12.5 TABLET ORAL at 22:21

## 2025-07-13 RX ADMIN — SODIUM CHLORIDE 1000 ML: 0.9 INJECTION, SOLUTION INTRAVENOUS at 22:23

## 2025-07-14 NOTE — ED PROVIDER NOTES
[facility] EMERGENCY DEPT  EMERGENCY DEPARTMENT HISTORY AND PHYSICAL EXAM      Date of evaluation: 7/13/2025  Patient Name: Zhen Mcgowan  Birthdate 1960  MRN: [id number]  ED Provider: Crys Hancock DO   Note Started: 11:35 PM EDT 7/13/25    HISTORY OF PRESENT ILLNESS     Chief Complaint   Patient presents with    Dizziness              History Provided By: Patient, daughter/son     HPI: Zhen Mcgowan is a 64 y.o. male who presents with dizziness that started shortly prior to arrival.     PAST MEDICAL HISTORY   Past Medical History:  Past Medical History:   Diagnosis Date    Depression 8/26/2014    Hypothyroidism 8/26/2014    Migraines 8/26/2014       Past Surgical History:  Past Surgical History:   Procedure Laterality Date    APPENDECTOMY      HERNIA REPAIR      ORTHOPEDIC SURGERY      meniscus       Family History:  Family History   Problem Relation Age of Onset    Stroke Father     Heart Disease Father     Cancer Father         colon    Cancer Mother         lung       Social History:  Social History     Tobacco Use    Smoking status: Never    Smokeless tobacco: Never   Substance Use Topics    Alcohol use: Yes     Comment: occ    Drug use: No       Allergies:  Allergies   Allergen Reactions    Cinnamon Rash     Reaction Type: Allergy    Pineapple Rash     Reaction Type: Allergy       PCP: Rob Berrios MD    Current Meds:   Current Facility-Administered Medications   Medication Dose Route Frequency Provider Last Rate Last Admin    meclizine (ANTIVERT) tablet 25 mg  25 mg Oral TID PRN Crys Hancock DO   25 mg at 07/13/25 2221     Current Outpatient Medications   Medication Sig Dispense Refill    triamcinolone (KENALOG) 0.1 % cream Apply topically 2 times daily. 45 g 0       Social Determinants of Health:   Social Drivers of Health     Tobacco Use: Medium Risk (3/4/2025)    Received from [facility]    Patient History     Smoking Tobacco Use: Never     Smokeless Tobacco Use: Former     Passive

## 2025-07-14 NOTE — DISCHARGE INSTRUCTIONS
You were seen in the emergency department for dizziness.  Based on your exam and your history, we do not believe your symptoms are consistent with a stroke and so we deferred CT imaging of your head today.  We treated you with IV fluids and medication for vertigo with improvement of your symptoms.  We are sending you home with the same medications and we are recommending some maneuvers for you to try at home to help improve your symptoms as well.  Please follow-up with your primary care doctor.  If you have any recurrent or worsening symptoms, you can return to the ER for reevaluation.

## 2025-07-14 NOTE — ED TRIAGE NOTES
ED visit d/t dizziness / headache - onset of sxs, PTA - Endorses, reports having sudden dizziness / headache after eating a meal with family - Denies falls / trauma / fevers / chills / sick contacts;;

## 2025-08-13 ENCOUNTER — OFFICE VISIT (OUTPATIENT)
Age: 65
End: 2025-08-13
Payer: COMMERCIAL

## 2025-08-13 DIAGNOSIS — E10.9 TYPE 1 DIABETES MELLITUS WITHOUT COMPLICATION (HCC): Primary | ICD-10-CM

## 2025-08-13 PROCEDURE — G0108 DIAB MANAGE TRN  PER INDIV: HCPCS

## 2025-08-20 ENCOUNTER — OFFICE VISIT (OUTPATIENT)
Age: 65
End: 2025-08-20
Payer: COMMERCIAL

## 2025-08-20 DIAGNOSIS — E10.9 TYPE 1 DIABETES MELLITUS WITHOUT COMPLICATION (HCC): Primary | ICD-10-CM

## 2025-08-20 PROCEDURE — G0108 DIAB MANAGE TRN  PER INDIV: HCPCS
